# Patient Record
Sex: FEMALE | Race: WHITE | NOT HISPANIC OR LATINO | Employment: FULL TIME | URBAN - METROPOLITAN AREA
[De-identification: names, ages, dates, MRNs, and addresses within clinical notes are randomized per-mention and may not be internally consistent; named-entity substitution may affect disease eponyms.]

---

## 2023-11-29 ENCOUNTER — TELEPHONE (OUTPATIENT)
Dept: OBSTETRICS AND GYNECOLOGY | Facility: CLINIC | Age: 28
End: 2023-11-29
Payer: COMMERCIAL

## 2023-11-29 DIAGNOSIS — N92.6 MISSED MENSES: Primary | ICD-10-CM

## 2023-11-29 NOTE — TELEPHONE ENCOUNTER
Patient has LMP of 10/30/23 and positive pregnancy test at home. Would like to schedule her NOB with Dr. Mcmahon. 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 Orders sent to Dr. Mcmahon for serial HCGs.

## 2023-12-01 ENCOUNTER — LAB (OUTPATIENT)
Dept: LAB | Facility: LAB | Age: 28
End: 2023-12-01
Payer: COMMERCIAL

## 2023-12-01 DIAGNOSIS — N92.6 MISSED MENSES: ICD-10-CM

## 2023-12-01 DIAGNOSIS — Z32.01 PREGNANCY TEST POSITIVE (HHS-HCC): ICD-10-CM

## 2023-12-01 LAB — B-HCG SERPL-ACNC: 2035 MIU/ML

## 2023-12-01 PROCEDURE — 86780 TREPONEMA PALLIDUM: CPT

## 2023-12-01 PROCEDURE — 84702 CHORIONIC GONADOTROPIN TEST: CPT

## 2023-12-01 PROCEDURE — 86317 IMMUNOASSAY INFECTIOUS AGENT: CPT

## 2023-12-01 PROCEDURE — 36415 COLL VENOUS BLD VENIPUNCTURE: CPT

## 2023-12-04 ENCOUNTER — LAB (OUTPATIENT)
Dept: LAB | Facility: LAB | Age: 28
End: 2023-12-04
Payer: COMMERCIAL

## 2023-12-04 DIAGNOSIS — N92.6 MISSED MENSES: ICD-10-CM

## 2023-12-04 DIAGNOSIS — Z32.01 PREGNANCY TEST POSITIVE (HHS-HCC): ICD-10-CM

## 2023-12-04 LAB — B-HCG SERPL-ACNC: 5858 MIU/ML

## 2023-12-04 PROCEDURE — 86803 HEPATITIS C AB TEST: CPT

## 2023-12-04 PROCEDURE — 84702 CHORIONIC GONADOTROPIN TEST: CPT

## 2023-12-04 PROCEDURE — 36415 COLL VENOUS BLD VENIPUNCTURE: CPT

## 2023-12-04 PROCEDURE — 80053 COMPREHEN METABOLIC PANEL: CPT

## 2023-12-04 PROCEDURE — 87340 HEPATITIS B SURFACE AG IA: CPT

## 2023-12-05 ENCOUNTER — INITIAL PRENATAL (OUTPATIENT)
Dept: OBSTETRICS AND GYNECOLOGY | Facility: CLINIC | Age: 28
End: 2023-12-05
Payer: COMMERCIAL

## 2023-12-05 VITALS — DIASTOLIC BLOOD PRESSURE: 81 MMHG | WEIGHT: 175.2 LBS | BODY MASS INDEX: 29.15 KG/M2 | SYSTOLIC BLOOD PRESSURE: 130 MMHG

## 2023-12-05 DIAGNOSIS — Z32.01 PREGNANCY TEST POSITIVE (HHS-HCC): Primary | ICD-10-CM

## 2023-12-05 LAB
ALBUMIN SERPL BCP-MCNC: 4.8 G/DL (ref 3.4–5)
ALP SERPL-CCNC: 42 U/L (ref 33–110)
ALT SERPL W P-5'-P-CCNC: 28 U/L (ref 7–45)
ANION GAP SERPL CALC-SCNC: 16 MMOL/L (ref 10–20)
AST SERPL W P-5'-P-CCNC: 19 U/L (ref 9–39)
BILIRUB SERPL-MCNC: 0.6 MG/DL (ref 0–1.2)
BUN SERPL-MCNC: 12 MG/DL (ref 6–23)
CALCIUM SERPL-MCNC: 10 MG/DL (ref 8.6–10.6)
CHLORIDE SERPL-SCNC: 104 MMOL/L (ref 98–107)
CO2 SERPL-SCNC: 24 MMOL/L (ref 21–32)
CREAT SERPL-MCNC: 0.79 MG/DL (ref 0.5–1.05)
CREAT UR-MCNC: 155.2 MG/DL (ref 20–320)
GFR SERPL CREATININE-BSD FRML MDRD: >90 ML/MIN/1.73M*2
GLUCOSE SERPL-MCNC: 75 MG/DL (ref 74–99)
HBV SURFACE AG SERPL QL IA: NONREACTIVE
HCV AB SER QL: NONREACTIVE
POTASSIUM SERPL-SCNC: 4.5 MMOL/L (ref 3.5–5.3)
PROT SERPL-MCNC: 7.4 G/DL (ref 6.4–8.2)
PROT UR-ACNC: 7 MG/DL (ref 5–24)
PROT/CREAT UR: 0.05 MG/MG CREAT (ref 0–0.17)
SODIUM SERPL-SCNC: 139 MMOL/L (ref 136–145)

## 2023-12-05 PROCEDURE — 0500F INITIAL PRENATAL CARE VISIT: CPT | Performed by: OBSTETRICS & GYNECOLOGY

## 2023-12-05 PROCEDURE — 87661 TRICHOMONAS VAGINALIS AMPLIF: CPT | Mod: 59 | Performed by: OBSTETRICS & GYNECOLOGY

## 2023-12-05 PROCEDURE — 87086 URINE CULTURE/COLONY COUNT: CPT | Performed by: OBSTETRICS & GYNECOLOGY

## 2023-12-05 PROCEDURE — 84156 ASSAY OF PROTEIN URINE: CPT | Performed by: OBSTETRICS & GYNECOLOGY

## 2023-12-05 PROCEDURE — 87800 DETECT AGNT MULT DNA DIREC: CPT | Performed by: OBSTETRICS & GYNECOLOGY

## 2023-12-05 ASSESSMENT — EDINBURGH POSTNATAL DEPRESSION SCALE (EPDS)
I HAVE BEEN ABLE TO LAUGH AND SEE THE FUNNY SIDE OF THINGS: AS MUCH AS I ALWAYS COULD
I HAVE BEEN SO UNHAPPY THAT I HAVE HAD DIFFICULTY SLEEPING: NOT AT ALL
I HAVE BEEN SO UNHAPPY THAT I HAVE BEEN CRYING: NO, NEVER
I HAVE BLAMED MYSELF UNNECESSARILY WHEN THINGS WENT WRONG: NO, NEVER
TOTAL SCORE: 0
I HAVE FELT SCARED OR PANICKY FOR NO GOOD REASON: NO, NOT AT ALL
I HAVE BEEN ANXIOUS OR WORRIED FOR NO GOOD REASON: NO, NOT AT ALL
I HAVE FELT SAD OR MISERABLE: NO, NOT AT ALL
I HAVE LOOKED FORWARD WITH ENJOYMENT TO THINGS: AS MUCH AS I EVER DID
THINGS HAVE BEEN GETTING ON TOP OF ME: NO, I HAVE BEEN COPING AS WELL AS EVER
THE THOUGHT OF HARMING MYSELF HAS OCCURRED TO ME: NEVER

## 2023-12-05 NOTE — PROGRESS NOTES
Subjective   Aron Salinas is a 28 y.o. G1 here to establish OB care. Mirena removed in April 2023. Current OB sx = none. Taking PNV. Pregnancy uncomplicated. Pt was doing OPKs and notes she ovulated a bit later than what her menstrual tracking rebeca told her.    Gynecologic History  Patient's last menstrual period was 10/30/2023.  Cycles regular without dysmenorrhea or menorrhagia  HSV: denies  LEEP: denies. Pap due Oct 2024.    Past Medical History  none    Past Surgical History   none    Social History  Partner: Raman (PGY4 surgery resident)  Occupation: Med-Peds resident (PGY4)  Substances: No T/E/D  Abuse: denies    Allergies  Penicillins and Sulfa (sulfonamide antibiotics)    Medications  PNV    Objective   Vitals:    12/05/23 1512   BP: 130/81     Physical Examination   General:   Alert and oriented, in no acute distress   Neck: Supple. No visible thyromegaly.    Abdomen: Soft, non-tender, gravid   : Normal genitourinary exam without lesions, masses, or abnormal discharge   Psych Normal affect. Normal mood.      TVUS: Fundally located GS seen, no YS or FP yet.     Assessment   Assessment/Plan  Pt was oriented to the practice. Prenatal labs and ultrasound were ordered to be drawn at 12 weeks. Pt opted for genetic screening with cfDNA, deciding btw standard and expanded screening as her brother has T21. Problem list and OB overview updated.    Return OB Visit: 4 weeks with repeat bedside US at that time.    Corrine Mcmahon MD

## 2023-12-05 NOTE — PATIENT INSTRUCTIONS
Welcome to Dr. Mcmahon[facility]!  Below are some commonly asked questions about this practice.    Q: Does Dr. Mcmahon work by herself or as part of a physician group?  Dr. Mcmahon works with a group of other doctors, as well as midwives, in the Department of Ob/Gyn at [facility]. Most of your visits will be with Dr. Mcmahon, but if she is unavailable, an appointment may be scheduled with one of her partners.     Q: How often do I need to see Dr. Mcmahon during my pregnancy?  You need to be seen monthly until the third trimester, then every two weeks from 32-36 weeks, and then weekly until delivery. Some patients may need to be seen more frequently.     Q: What does a prenatal visit involve?  Prenatal appointments are scheduled for 15 minutes, and include getting vital signs, basic labs like urine analysis, and discussion of prenatal symptoms and care recommendations. Ultrasounds are separate and do not replace prenatal visits. If you want to discuss something in depth, you may need to schedule more than one visit to complete the conversation.    Q: Does [facility] utilize a patient portal?  Yes!  [employer] is the strongly preferred way for you to communicate with your healthcare team and to manage your appointments, medications, labs, and more. More info can be found at: [email or web address]     Q: I need to reschedule or cancel an appointment. What should I do?  In order to meet the needs of all our patients, we respectfully ask that you give at least 24 hours notice if you need to cancel or reschedule an appointment.  Please call the office directly to alert them of your change in plans. Patients who repeatedly miss appointments may be dismissed from the practice.    Q: How can I reach Dr. Mcmahon or her staff?  For non-urgent requests, the best way to communicate with us is through  [employer]. For urgent issues, you can call the office. The office number for [place] is [phone] and  the office number for [place] is [phone]. During office hours, the  can transfer you to the nurse line, and after office hours, an answering service can page the doctor on call.    Q: Who will deliver my baby?  Our practice is a group model, meaning Dr. Mcmahon only works on Labor & Delivery a few times a month. Importantly, Dr. Mcmahon only delivers at [facility], not the [facility]. Therefore, it is common for her patients to be delivered by one of her partners at either location. Each of her partners have access to your prenatal information, and hold the same high standards she holds when it comes to caring for you.    Q: What insurances are accepted by Dr. Mcmahon?  Please check with your insurance carrier to confirm that both [facility] and Dr. Mcmahon are within your network. If you have concerns about losing insurance coverage, you may contact the  Insurance Access Line at [phone].      DR. MCMAHON'S PREGNANCY PREP GUIDE    Pregnancy is a life-changing journey, each and every time. Below are some materials and information that may build your confidence, comfort, and knowledge!    SMARTPHONE APPS  Oxxy Pregnancy  Free rebeca that allows you to track your pregnancy's gestational age, fetal growth, and fetal development    Blurb  Information on safety of medications in both pregnancy and breastfeeding, created by the Infant Risk Center at [facility]. You can also contact the Infant Risk organization if the medication you have questions about is not listed in the rebeca.    BOOKS  [facility] Guide to a Healthy Pregnancy: From Doctors who are Parents, Too!  User friendly, practical book by a trusted resource    [facility] Hospital Birth   A non-biased guide to achieving a physiologic birth in a hospital setting, focusing on what you can do, and how to communicate effectively with staff. I recommend pairing this with the website Evidence Based Birth® to stimulate some  thoughtful conversation with your OB provider.    The Fourth Trimester   Rdz postpartum advice from an experienced  and midwife who has worked in high risk settings. Discusses sleep, feeding, intimacy, work, and other important topics from the postpartum period.    The Womanly Art of Breastfeeding  Written by the Yessi Rodriguez, this book has all sorts of practical tips to help you succeed in breastfeeding    Caring for Your Baby and Young Child: Birth to Age 5  Created by the American Academy of Pediatrics, this user-friendly manual provides practical and evidence-based advice for caring for your baby after birth.    PRENATAL NUTRITION  A balanced and healthy diet is good for mom and baby. In general, aim for the following amounts, either through diet or supplementation: folic acid 800mcg daily, omega 3 fatty acids (such as DHA) 250mg daily, Vitamin D3 800IU daily, calcium 1200mg daily, and choline 450mg daily. Fish is a wonderful source of protein and choline (good for baby's brain!) and you can find the FDA's advisory for eating fish during pregnancy here.     PRODUCTS  There is no end to pregnancy-related products. It is, after all, a multi-billion dollar industry. Good sleep, good nutrition, and good stress reduction will help your body cope with pregnancy and recover from delivery better than any one product.     That being said, here are some general items that are helpful during pregnancy:   Maternity clothes: There's no need to test the limits of your pre-pregnancy clothes. Switching into maternity clothes in the second trimester often helps moms feel physically and emotionally more comfortable.  Pregnancy pillow: A good night's sleep is going to be elusive especially in the third trimester. Consider purchasing a supportive body pillow to support your back, stomach, and legs.   Maternity support belt: Pelvic and hip pain can start as early as the second trimester. Ask our office about  maternity belts that may be covered by your insurance. You can also buy belts for around $30 online.  Compression socks: Blood can pool in the legs during pregnancy, causing swelling. Consider wearing compression stockings if you start to experience swelling, and especially if you'll be doing any traveling.  Rich body lotions: Anything that maintains the skin's hydration and elasticity helps diminish the appearance of stretch marks.     Other helpful items to keep around:  a water bottle to keep yourself hydrated, a good heating pad and massage roller for body aches, a yoga ball to sit on in the third trimester, a laxative like Miralax for constipation, TUMs for heartburn, a humidifier and nasal strips for congestion, and high fiber snacks for when you get hungry.     PAPERWORK, PUMPS, AND MORE  Need a proof of pregnancy form? A note for your dentist? A work note? Our office staff should be able to provide you with a copy of any of these forms at any of your visits. Short-term disability and Family Medical Leave Act (FMLA) paperwork should be submitted prior to your due date. Please allow 5-10 business days for processing.     Our office provides a prescription for a breast pump at your request. Many insurance companies cover breast pumps in full. We recommend a double electric breast pump that is portable.    Finally, good communication is parker to good health. Please make sure your phone number, email, and/or address is accurate in our system, and promptly notify the office of any changes.    We strive to provide our patients with the best possible care during their pregnancies, and we are constantly aiming to improve. If you have feedback to give Dr. Mcmahon about her office or practice, feel free to message her via  [employer].

## 2023-12-05 NOTE — LETTER
12/5/2023    To Whom It May Concern:    This is to certify that my patient,Aron Salinas is under my care for her pregnancy.    The following guidelines may be used for any dental work:  You may use a local anesthetic with or without Epinephrine.  You may prescribe any Penicillin or Cephalosporin if an antibiotic is necessary. (Dependent on allergy history)  You may take x-rays with a lead apron if necessary.  You may prescribe Tylenol and/or narcotics for pain.  You may extract teeth if necessary.    If you need further information or if you have any concerns, please contact our office.    Sincerely,        Corrine Mcmahon MD   [facility] [place]

## 2023-12-05 NOTE — LETTER
12/5/2023    To Whom It May Concern:    Aron Salinas is being followed for her pregnancy at [facility].  Estimated Date of Delivery: 8/5/24    Sincerely,        Corrine Mcmahon MD  [facility]

## 2023-12-05 NOTE — Clinical Note
RAY with Lisandro monthly x 7, ok to start at [place] if no availability at [facility], but ultimately plan to get care at [facility] location.

## 2023-12-06 LAB
C TRACH RRNA SPEC QL NAA+PROBE: NEGATIVE
N GONORRHOEA DNA SPEC QL PROBE+SIG AMP: NEGATIVE
RUBV IGG SERPL IA-ACNC: 2.4 IA
RUBV IGG SERPL QL IA: POSITIVE
T PALLIDUM AB SER QL: NONREACTIVE
T VAGINALIS RRNA SPEC QL NAA+PROBE: NEGATIVE

## 2023-12-07 LAB — BACTERIA UR CULT: NORMAL

## 2024-01-09 ENCOUNTER — ROUTINE PRENATAL (OUTPATIENT)
Dept: OBSTETRICS AND GYNECOLOGY | Facility: CLINIC | Age: 29
End: 2024-01-09
Payer: COMMERCIAL

## 2024-01-09 ENCOUNTER — LAB (OUTPATIENT)
Dept: LAB | Facility: LAB | Age: 29
End: 2024-01-09
Payer: COMMERCIAL

## 2024-01-09 VITALS — SYSTOLIC BLOOD PRESSURE: 132 MMHG | BODY MASS INDEX: 28.54 KG/M2 | WEIGHT: 171.5 LBS | DIASTOLIC BLOOD PRESSURE: 82 MMHG

## 2024-01-09 DIAGNOSIS — R03.0 ELEVATED BLOOD PRESSURE READING IN OFFICE WITHOUT DIAGNOSIS OF HYPERTENSION: ICD-10-CM

## 2024-01-09 DIAGNOSIS — Z34.01 FIRST PREGNANCY, FIRST TRIMESTER (HHS-HCC): Primary | ICD-10-CM

## 2024-01-09 DIAGNOSIS — Z82.79 FAMILY HISTORY OF DOWN SYNDROME: ICD-10-CM

## 2024-01-09 DIAGNOSIS — Z32.01 PREGNANCY TEST POSITIVE (HHS-HCC): ICD-10-CM

## 2024-01-09 LAB
ABO GROUP (TYPE) IN BLOOD: NORMAL
ANTIBODY SCREEN: NORMAL
ERYTHROCYTE [DISTWIDTH] IN BLOOD BY AUTOMATED COUNT: 11.9 % (ref 11.5–14.5)
EST. AVERAGE GLUCOSE BLD GHB EST-MCNC: 91 MG/DL
HBA1C MFR BLD: 4.8 %
HCT VFR BLD AUTO: 36.6 % (ref 36–46)
HGB BLD-MCNC: 12.9 G/DL (ref 12–16)
HIV 1+2 AB+HIV1 P24 AG SERPL QL IA: NONREACTIVE
MCH RBC QN AUTO: 30.1 PG (ref 26–34)
MCHC RBC AUTO-ENTMCNC: 35.2 G/DL (ref 32–36)
MCV RBC AUTO: 85 FL (ref 80–100)
NRBC BLD-RTO: 0 /100 WBCS (ref 0–0)
PLATELET # BLD AUTO: 314 X10*3/UL (ref 150–450)
RBC # BLD AUTO: 4.29 X10*6/UL (ref 4–5.2)
RH FACTOR (ANTIGEN D): NORMAL
WBC # BLD AUTO: 8.9 X10*3/UL (ref 4.4–11.3)

## 2024-01-09 PROCEDURE — 81220 CFTR GENE COM VARIANTS: CPT

## 2024-01-09 PROCEDURE — 86900 BLOOD TYPING SEROLOGIC ABO: CPT

## 2024-01-09 PROCEDURE — 83036 HEMOGLOBIN GLYCOSYLATED A1C: CPT

## 2024-01-09 PROCEDURE — 85027 COMPLETE CBC AUTOMATED: CPT

## 2024-01-09 PROCEDURE — 0501F PRENATAL FLOW SHEET: CPT | Performed by: OBSTETRICS & GYNECOLOGY

## 2024-01-09 PROCEDURE — 36415 COLL VENOUS BLD VENIPUNCTURE: CPT

## 2024-01-09 PROCEDURE — 86850 RBC ANTIBODY SCREEN: CPT

## 2024-01-09 PROCEDURE — 87389 HIV-1 AG W/HIV-1&-2 AB AG IA: CPT

## 2024-01-09 PROCEDURE — G0452 MOLECULAR PATHOLOGY INTERPR: HCPCS | Performed by: OBSTETRICS & GYNECOLOGY

## 2024-01-09 PROCEDURE — 81329 SMN1 GENE DOS/DELETION ALYS: CPT

## 2024-01-09 PROCEDURE — 86901 BLOOD TYPING SEROLOGIC RH(D): CPT

## 2024-01-09 RX ORDER — ACETAMINOPHEN 500 MG
1 TABLET ORAL 2 TIMES DAILY
Qty: 1 KIT | Refills: 0 | Status: SHIPPED | OUTPATIENT
Start: 2024-01-09 | End: 2024-01-09 | Stop reason: SDUPTHER

## 2024-01-09 RX ORDER — ACETAMINOPHEN 500 MG
1 TABLET ORAL 2 TIMES DAILY
Qty: 1 KIT | Refills: 0 | Status: SHIPPED | OUTPATIENT
Start: 2024-01-09 | End: 2025-01-08

## 2024-01-09 RX ORDER — NAPROXEN SODIUM 220 MG/1
162 TABLET, FILM COATED ORAL DAILY
Qty: 90 TABLET | Refills: 2 | Status: SHIPPED | OUTPATIENT
Start: 2024-01-09 | End: 2025-01-08

## 2024-01-09 NOTE — PROGRESS NOTES
Feeling well, no complaints.  Here with  Raman.  Initial labs wnl. Will need to complete 1st tri labs.  Continues to have /80s but not requiring meds. Asymptomatic.    TAUS: viable stephen IUP measuring 10+1 weeks with FHR 176bpm. No NEYMAR. No abnormal findings.    - Pt will have PNL and Myriad with expanded screening drawn today.  - NT at 12 weeks.  - Stage 1 HTN vs white coat HT: Home BP monitoring. Recommend bASA at 12 weeks given stage 1 HTN in office not requiring meds.  - FU with me at 16 weeks scheduled.    Corrine Mcmahon MD

## 2024-01-18 LAB
ELECTRONICALLY SIGNED BY: NORMAL
SMA RESULT: NORMAL

## 2024-01-19 LAB
CFTR MUT ANL BLD/T: NORMAL
ELECTRONICALLY SIGNED BY: NORMAL

## 2024-01-23 ENCOUNTER — APPOINTMENT (OUTPATIENT)
Dept: OBSTETRICS AND GYNECOLOGY | Facility: CLINIC | Age: 29
End: 2024-01-23
Payer: COMMERCIAL

## 2024-01-23 ENCOUNTER — HOSPITAL ENCOUNTER (OUTPATIENT)
Dept: RADIOLOGY | Facility: CLINIC | Age: 29
Discharge: HOME | End: 2024-01-23
Payer: COMMERCIAL

## 2024-01-23 DIAGNOSIS — Z32.01 PREGNANCY TEST POSITIVE (HHS-HCC): ICD-10-CM

## 2024-01-23 PROCEDURE — 76815 OB US LIMITED FETUS(S): CPT | Performed by: MEDICAL GENETICS

## 2024-01-23 PROCEDURE — 76801 OB US < 14 WKS SINGLE FETUS: CPT

## 2024-01-24 ENCOUNTER — APPOINTMENT (OUTPATIENT)
Dept: RADIOLOGY | Facility: CLINIC | Age: 29
End: 2024-01-24
Payer: COMMERCIAL

## 2024-01-24 LAB — SCAN RESULT: NORMAL

## 2024-02-21 ENCOUNTER — OFFICE VISIT (OUTPATIENT)
Dept: OBSTETRICS AND GYNECOLOGY | Facility: CLINIC | Age: 29
End: 2024-02-21
Payer: COMMERCIAL

## 2024-02-21 VITALS — WEIGHT: 178 LBS | BODY MASS INDEX: 29.62 KG/M2 | DIASTOLIC BLOOD PRESSURE: 81 MMHG | SYSTOLIC BLOOD PRESSURE: 124 MMHG

## 2024-02-21 DIAGNOSIS — Z82.79 FAMILY HISTORY OF DOWN SYNDROME: Primary | ICD-10-CM

## 2024-02-21 DIAGNOSIS — R03.0 WHITE COAT SYNDROME WITHOUT DIAGNOSIS OF HYPERTENSION: ICD-10-CM

## 2024-02-21 PROBLEM — Z34.01: Status: RESOLVED | Noted: 2024-01-09 | Resolved: 2024-02-21

## 2024-02-21 PROCEDURE — 3079F DIAST BP 80-89 MM HG: CPT | Performed by: OBSTETRICS & GYNECOLOGY

## 2024-02-21 PROCEDURE — 3074F SYST BP LT 130 MM HG: CPT | Performed by: OBSTETRICS & GYNECOLOGY

## 2024-02-21 PROCEDURE — 1036F TOBACCO NON-USER: CPT | Performed by: OBSTETRICS & GYNECOLOGY

## 2024-02-21 PROCEDURE — 99214 OFFICE O/P EST MOD 30 MIN: CPT | Mod: TH | Performed by: OBSTETRICS & GYNECOLOGY

## 2024-02-21 PROCEDURE — 99214 OFFICE O/P EST MOD 30 MIN: CPT | Performed by: OBSTETRICS & GYNECOLOGY

## 2024-02-21 NOTE — PROGRESS NOTES
PNL reviewed and wnl  NT could not be measured but pt had RR cfDNA - BOY  White Coat HTN: Home -120s/70s. Start bASA.  Fibroids seen on 1st trimester scan, largest 4cm.     Corrine Mcmahon MD

## 2024-03-15 ENCOUNTER — TELEPHONE (OUTPATIENT)
Dept: OBSTETRICS AND GYNECOLOGY | Facility: CLINIC | Age: 29
End: 2024-03-15
Payer: COMMERCIAL

## 2024-03-15 NOTE — TELEPHONE ENCOUNTER
"Patient called with complaint of 45 pound dog jumping on her abdomen 2 days ago.  Patient has some slight \"cramping\" below her umbilicus especially on movement.    Patient denies acute pain, vaginal bleeding or leaking, vomiting,  She is urinating without pain or difficulty.    She is 19 weeks and 4 days.  Patient will monitor and go to L+D for any increasing pain or bleeding.  Plan reviewed with Dr. Humphreys.  Will keep 3/20 appointment for anatomy scan and prenatal appointment.    "

## 2024-03-20 ENCOUNTER — OFFICE VISIT (OUTPATIENT)
Dept: OBSTETRICS AND GYNECOLOGY | Facility: CLINIC | Age: 29
End: 2024-03-20
Payer: COMMERCIAL

## 2024-03-20 ENCOUNTER — HOSPITAL ENCOUNTER (OUTPATIENT)
Dept: RADIOLOGY | Facility: CLINIC | Age: 29
Discharge: HOME | End: 2024-03-20
Payer: COMMERCIAL

## 2024-03-20 VITALS — BODY MASS INDEX: 30.32 KG/M2 | DIASTOLIC BLOOD PRESSURE: 73 MMHG | SYSTOLIC BLOOD PRESSURE: 125 MMHG | WEIGHT: 182.2 LBS

## 2024-03-20 DIAGNOSIS — Z34.02 ENCOUNTER FOR SUPERVISION OF NORMAL FIRST PREGNANCY, SECOND TRIMESTER (HHS-HCC): Primary | ICD-10-CM

## 2024-03-20 DIAGNOSIS — Z36.2 ENCOUNTER FOR OTHER ANTENATAL SCREENING FOLLOW-UP (HHS-HCC): ICD-10-CM

## 2024-03-20 DIAGNOSIS — R03.0 WHITE COAT SYNDROME WITHOUT DIAGNOSIS OF HYPERTENSION: ICD-10-CM

## 2024-03-20 PROCEDURE — 99213 OFFICE O/P EST LOW 20 MIN: CPT | Performed by: OBSTETRICS & GYNECOLOGY

## 2024-03-20 PROCEDURE — 99213 OFFICE O/P EST LOW 20 MIN: CPT | Mod: TH | Performed by: OBSTETRICS & GYNECOLOGY

## 2024-03-20 PROCEDURE — 76811 OB US DETAILED SNGL FETUS: CPT

## 2024-03-20 PROCEDURE — 1036F TOBACCO NON-USER: CPT | Performed by: OBSTETRICS & GYNECOLOGY

## 2024-03-20 PROCEDURE — 3074F SYST BP LT 130 MM HG: CPT | Performed by: OBSTETRICS & GYNECOLOGY

## 2024-03-20 PROCEDURE — 76811 OB US DETAILED SNGL FETUS: CPT | Performed by: OBSTETRICS & GYNECOLOGY

## 2024-03-20 PROCEDURE — 3078F DIAST BP <80 MM HG: CPT | Performed by: OBSTETRICS & GYNECOLOGY

## 2024-03-20 NOTE — PROGRESS NOTES
Anatomy scan today  White coat HTN: BP nl now.    Has intermittent low midline cramping a few times a day, mostly when standing.  Does not sound like PTL.  Not feeling much mvmt yet. Has anterior placenta.    Corrine Mcmahon MD

## 2024-04-17 ENCOUNTER — APPOINTMENT (OUTPATIENT)
Dept: OBSTETRICS AND GYNECOLOGY | Facility: CLINIC | Age: 29
End: 2024-04-17
Payer: COMMERCIAL

## 2024-04-17 ENCOUNTER — ROUTINE PRENATAL (OUTPATIENT)
Dept: OBSTETRICS AND GYNECOLOGY | Facility: CLINIC | Age: 29
End: 2024-04-17
Payer: COMMERCIAL

## 2024-04-17 VITALS — BODY MASS INDEX: 31.02 KG/M2 | WEIGHT: 186.4 LBS | DIASTOLIC BLOOD PRESSURE: 80 MMHG | SYSTOLIC BLOOD PRESSURE: 128 MMHG

## 2024-04-17 DIAGNOSIS — Z34.02 ENCOUNTER FOR SUPERVISION OF NORMAL FIRST PREGNANCY, SECOND TRIMESTER (HHS-HCC): ICD-10-CM

## 2024-04-17 DIAGNOSIS — R03.0 WHITE COAT SYNDROME WITHOUT DIAGNOSIS OF HYPERTENSION: Primary | ICD-10-CM

## 2024-04-17 PROCEDURE — 0501F PRENATAL FLOW SHEET: CPT | Performed by: OBSTETRICS & GYNECOLOGY

## 2024-04-17 ASSESSMENT — EDINBURGH POSTNATAL DEPRESSION SCALE (EPDS)
THE THOUGHT OF HARMING MYSELF HAS OCCURRED TO ME: NEVER
I HAVE BEEN SO UNHAPPY THAT I HAVE HAD DIFFICULTY SLEEPING: NOT AT ALL
I HAVE BEEN SO UNHAPPY THAT I HAVE BEEN CRYING: NO, NEVER
TOTAL SCORE: 0
I HAVE FELT SCARED OR PANICKY FOR NO GOOD REASON: NO, NOT AT ALL
THINGS HAVE BEEN GETTING ON TOP OF ME: NO, I HAVE BEEN COPING AS WELL AS EVER
I HAVE BLAMED MYSELF UNNECESSARILY WHEN THINGS WENT WRONG: NO, NEVER
I HAVE FELT SAD OR MISERABLE: NO, NOT AT ALL
I HAVE BEEN ANXIOUS OR WORRIED FOR NO GOOD REASON: NO, NOT AT ALL
I HAVE BEEN ABLE TO LAUGH AND SEE THE FUNNY SIDE OF THINGS: AS MUCH AS I ALWAYS COULD
I HAVE LOOKED FORWARD WITH ENJOYMENT TO THINGS: AS MUCH AS I EVER DID

## 2024-04-17 NOTE — PATIENT INSTRUCTIONS
DR. VANG'S PREGNANCY SUPPORT    [facility] CHILDBIRTH & PARENTING EDUCATION (Virtual & By Appointment Services)  http://www.[facility].org/gavino/health-and-wellness/pregnancy-resources/childbirth-and-parenting-education-programs  [phone]  Recommended programs include the Prepared Childbirth series, the Spinning Babies course, the Infant Care series, Boot Camp for New Dads, Car Seat Safety assessment, Friends & Family CPR, and Prenatal Tours. If planning to labor without an epidural, recommend the Hypnobirthing and Spinning Babies course.    [facility] INTEGRATIVE HEALTH NETWORK  https://www.[facility].org/services/integrative-health-network/meet-the-team  [phone]  Network of acupuncturists, massage therapists, chiropractors, and integrative medicine specialists who assist with a variety of pregnancy-related concerns including but not limited to muscle or joint pain, breech presentation, and chronic pain conditions.    [facility] WOMEN'S MENTAL HEALTH CLINIC  https://www.[facility].org/services/psychiatry/conditions-treatments/womens-mental-health   [phone]  Specially trained team of mental health professionals who are experts in the treatment of anxiety, depression, bipolar illness, emotional trauma, and other mental conditions affecting pregnancy.    LOOKING FOR PROFESSIONAL BIRTH SUPPORT?  [employer] ( Certifying Organization)  https://www.ruddy.org/  Type in your zip code to find a certified birth and postpartum  near you    [facility]  http://www.[facility].org/    CALM  SERVICES  https://calmlabKCAP Servicesce.Am[employer] Biosciences/    STEPHEN MA'AM  https://www.Sh[employer] Dajun Technologiesam.[employer]/    CLEBABY  https://www.clebaby.com    THE WOMB WELLNESS CENTER  http://www.Vastrm.[employer]/    NURTURED FOUNDATION   https://nurturedfArcos Technologies.com/    THE VILLAGE CECY  https://www.Optima Diagnosticsllagecle.org     FRUIT OF THE WOMB   SERVICES  https://www.fruitofthewombirth.com     HELPFUL ONLINE RESOURCES   Evidence Based Birth ® website    Spinning Babies® website: “Flip a Breech,” “Engaging Baby in Labor,” “Three Levels of the Pelvis”    Barbara Mata (CEDRIC-certified  and birth educator) videos on Youtube     Pregnancy and Postpartum TV videos (YouTube) - especially check out videos on diastasis recti, pelvic floor exercises, and pregnancy yoga for sciatica and low back pain     “How to Turn a Breech, Posterior or Transverse baby” - Fit Mums Channel (YouTube)    “How to do External Cephalic Version - Professional Version” - Merck Manuals (YouTube)    “False Labor vs.  True Labor” - [facility] (YouTube)    “The 8 Best Labor Positions for the First Phases of Childbirth” - The Bump (Youtube)    “ Section” - [employer] Baby[employer] (YouTube)     L[employer] Concept breastfeeding videos (YouTube) - especially check out “Hand Expression” and “Breast Massage for Engorgement”     “How to Put on a Haaka Silicone Breastpump” - New MiniMon[employer] Life by Eileen (Youtube)    DR. VANG'S GUIDE TO WRITING A BIRTH PLAN    A birth plan is a written statement of how a parent would like her labor and delivery to occur. Not every parent cares to make one, but a lot of parents do find the process of writing a birth plan useful.    I used to give patients a check-box style template to fill out, but I've found over time that such a document does not properly reflect the underlying goals of the birth plan, and can sometimes create unnecessary conflict between the parents and staff. If you are interested in a more templated type birth plan, I suggest exploring the one on the [place] [facility] website.     These days, I suggest you write a personalized birth plan. It should be relatively short (no more than 1-2 pages), polite, and help the care team understand you and your goals in a holistic way, highlighting any requests  that are very important to you. Use positive rather than negative statements (e.g., “I am eager to see what my body can accomplish naturally” rather than “I do not want Pitocin.”).    Some things to think about as you write your birth plan:  What would your ideal birth look like? How might you bring elements of that into your hospital birth? You don't have to necessarily put environmental “wants” into the birth plan document, but these are good things to discuss with your provider ahead of time, so you know what's allowed and what might be modified to meet your wishes.  What role will you play in achieving the birth you want? How did you prepare? How can the team support your work?   Some births will need medical intervention for the safety of the mom or baby. When such an intervention needs to occur, how would you like that interaction to look? What would make you feel safe and empowered when hard choices must be made?  Do you have past experiences, good or bad, that influence your vision for this birth? If so, consider sharing some aspect of that with your care providers. Be specific about the details that will make a huge difference to you, especially if you might experience resentment or regret if those details are missed.  Who will speak for you if you're too tired, too sick, or too engrossed in labor? Let staff know ahead of time, so they don't assume someone is substituting their preferences for yours.    Some things that parents commonly put in their birth plans are standard at [facility]. We allow a long labor before declaring it failed, we provide intermittent auscultation for medically qualified pregnancies, our episiotomy rate is <1%, we delay cord clamping for at least 30 seconds, and encourage skin-to-skin for healthy newborns. Your prenatal visits are a good time to learn more about the practice patterns at this particular institution.    I encourage you to complete your birth plan at least  2-3 weeks before your expected delivery date, so we can discuss it before you arrive in the hospital.     We look forward to working with you in achieving a safe and memorable birth.    [facility] GUIDELINES FOR FETAL MONITORING    During your birth, it is important to ensure you and your baby are safe.   For this reason, some degree of fetal monitoring is always performed.     Some women may qualify for intermittent monitoring instead of continuous monitoring. This allows the woman to move around more during her labor.     To qualify for this, the woman must have a normal fetal monitoring result when she is first admitted to the hospital, AND be absent any high risk criteria (see below):   The use of labor-inducing medications (oxytocin, misoprostol, or Cervidil®)   Epidural analgesia   Fentanyl patient controlled analgesia   Meconium stained amniotic fluid   Previous  delivery   Diabetes, other than gestational diet-controlled   Hypertensive disorder   Intrauterine growth restriction   Multiple gestation   Low fluid   Gestation < 37.0 weeks   History of previous stillbirth   Unexplained vaginal bleeding   Maternal temp > 37.9 °C   Other known indication for continuous electronic fetal monitoring     If you are interested in intermittent monitoring, please let your provider know so we can discuss it in more detail.    [facility] GUIDELINES FOR NITROUS OXIDE  Nitrous oxide is a patient-administered pre-epidural pain relief option at [facility].  If you have any of the following conditions, unfortunately you will NOT be able to utilize nitrous oxide:  Inability to hold mask to face independent  Received IV narcotics within the last 2 hours  Impaired or intoxicated  Impaired oxygenation: cystic fibrosis or chronic lung disease, baseline O2 saturation <96%, history of sleep apnea recommended for CPAP  Active COVID+   B12 deficiency  Conditions that include air in a closed body  space: bowel obstruction, acute ear infection, acute pneumothorax, recent craniotomy, increased intracranial pressure, penetrating eye injury, retinal surgery  Hemodynamically unstable  Magnesium Sulfate administration  <35 weeks gestation  Epidural administration

## 2024-05-13 ENCOUNTER — LAB (OUTPATIENT)
Dept: LAB | Facility: LAB | Age: 29
End: 2024-05-13
Payer: COMMERCIAL

## 2024-05-13 ENCOUNTER — ROUTINE PRENATAL (OUTPATIENT)
Dept: OBSTETRICS AND GYNECOLOGY | Facility: CLINIC | Age: 29
End: 2024-05-13
Payer: COMMERCIAL

## 2024-05-13 VITALS — WEIGHT: 191.8 LBS | DIASTOLIC BLOOD PRESSURE: 77 MMHG | SYSTOLIC BLOOD PRESSURE: 127 MMHG | BODY MASS INDEX: 31.92 KG/M2

## 2024-05-13 DIAGNOSIS — Z34.03 ENCOUNTER FOR PRENATAL CARE OF FIRST PREGNANCY, THIRD TRIMESTER (HHS-HCC): Primary | ICD-10-CM

## 2024-05-13 DIAGNOSIS — Z82.79 FAMILY HISTORY OF DOWN SYNDROME: ICD-10-CM

## 2024-05-13 DIAGNOSIS — R03.0 WHITE COAT SYNDROME WITHOUT DIAGNOSIS OF HYPERTENSION: ICD-10-CM

## 2024-05-13 DIAGNOSIS — N92.6 MISSED MENSES: ICD-10-CM

## 2024-05-13 DIAGNOSIS — Z34.02 ENCOUNTER FOR SUPERVISION OF NORMAL FIRST PREGNANCY, SECOND TRIMESTER (HHS-HCC): ICD-10-CM

## 2024-05-13 DIAGNOSIS — Z23 NEED FOR TDAP VACCINATION: ICD-10-CM

## 2024-05-13 LAB
25(OH)D3 SERPL-MCNC: 32 NG/ML (ref 30–100)
ERYTHROCYTE [DISTWIDTH] IN BLOOD BY AUTOMATED COUNT: 12.5 % (ref 11.5–14.5)
GLUCOSE 1H P 50 G GLC PO SERPL-MCNC: 149 MG/DL
HCT VFR BLD AUTO: 33.9 % (ref 36–46)
HGB BLD-MCNC: 11.4 G/DL (ref 12–16)
MCH RBC QN AUTO: 30.6 PG (ref 26–34)
MCHC RBC AUTO-ENTMCNC: 33.6 G/DL (ref 32–36)
MCV RBC AUTO: 91 FL (ref 80–100)
NRBC BLD-RTO: 0 /100 WBCS (ref 0–0)
PLATELET # BLD AUTO: 267 X10*3/UL (ref 150–450)
RBC # BLD AUTO: 3.73 X10*6/UL (ref 4–5.2)
REFLEX ADDED, ANEMIA PANEL: NORMAL
TREPONEMA PALLIDUM IGG+IGM AB [PRESENCE] IN SERUM OR PLASMA BY IMMUNOASSAY: NONREACTIVE
WBC # BLD AUTO: 11.2 X10*3/UL (ref 4.4–11.3)

## 2024-05-13 PROCEDURE — 0501F PRENATAL FLOW SHEET: CPT | Performed by: OBSTETRICS & GYNECOLOGY

## 2024-05-13 PROCEDURE — 36415 COLL VENOUS BLD VENIPUNCTURE: CPT

## 2024-05-13 PROCEDURE — 90715 TDAP VACCINE 7 YRS/> IM: CPT | Performed by: OBSTETRICS & GYNECOLOGY

## 2024-05-13 PROCEDURE — 85027 COMPLETE CBC AUTOMATED: CPT

## 2024-05-13 PROCEDURE — 86780 TREPONEMA PALLIDUM: CPT

## 2024-05-13 PROCEDURE — 82947 ASSAY GLUCOSE BLOOD QUANT: CPT

## 2024-05-13 PROCEDURE — 82306 VITAMIN D 25 HYDROXY: CPT

## 2024-05-13 NOTE — PROGRESS NOTES
SUBJECTIVE  HPI: Aron Salinas is a 28 y.o.  at 28w0d here for RPNV. Denies contractions, bleeding, or LOF. Reports normal fetal movement. Patient reports she will get 2nd tri labs done today. BP remain normal. Tdap today.    OBJECTIVE  Visit Vitals  /77   Wt 87 kg (191 lb 12.8 oz)   LMP 10/30/2023   BMI 31.92 kg/m²   OB Status Pregnant   Smoking Status Never   BSA 2 m²        ASSESSMENT & PLAN  Aron Salinas is a 28 y.o.  at 28w0d. Problem list updated and edits to plan as below:    Family history of Down syndrome  Sp RR expanded cfDNA - BOY    White coat syndrome without diagnosis of hypertension  bASA 162mg daily at 12 weeks  Home BP monitoring recommended     Orders Placed This Encounter   Procedures    Tdap vaccine, age 7 years and older  (BOOSTRIX)     RTC in 2 weeks    Corrine Mcmahon MD

## 2024-05-14 DIAGNOSIS — R73.09 ELEVATED GLUCOSE TOLERANCE TEST: ICD-10-CM

## 2024-05-18 ENCOUNTER — LAB (OUTPATIENT)
Dept: LAB | Facility: LAB | Age: 29
End: 2024-05-18
Payer: COMMERCIAL

## 2024-05-18 DIAGNOSIS — R73.09 ELEVATED GLUCOSE TOLERANCE TEST: ICD-10-CM

## 2024-05-18 LAB
GLUCOSE 1H P 100 G GLC PO SERPL-MCNC: 160 MG/DL
GLUCOSE 2H P 100 G GLC PO SERPL-MCNC: 135 MG/DL
GLUCOSE 3H P 100 G GLC PO SERPL-MCNC: 69 MG/DL
GLUCOSE P FAST SERPL-MCNC: 80 MG/DL

## 2024-05-18 PROCEDURE — 82952 GTT-ADDED SAMPLES: CPT

## 2024-05-18 PROCEDURE — 82951 GLUCOSE TOLERANCE TEST (GTT): CPT

## 2024-05-18 PROCEDURE — 82950 GLUCOSE TEST: CPT

## 2024-05-18 PROCEDURE — 36415 COLL VENOUS BLD VENIPUNCTURE: CPT

## 2024-05-18 PROCEDURE — 82947 ASSAY GLUCOSE BLOOD QUANT: CPT

## 2024-05-29 ENCOUNTER — ROUTINE PRENATAL (OUTPATIENT)
Dept: OBSTETRICS AND GYNECOLOGY | Facility: CLINIC | Age: 29
End: 2024-05-29
Payer: COMMERCIAL

## 2024-05-29 VITALS — SYSTOLIC BLOOD PRESSURE: 118 MMHG | BODY MASS INDEX: 32.72 KG/M2 | WEIGHT: 196.6 LBS | DIASTOLIC BLOOD PRESSURE: 77 MMHG

## 2024-05-29 DIAGNOSIS — Z34.03 ENCOUNTER FOR SUPERVISION OF NORMAL FIRST PREGNANCY, THIRD TRIMESTER (HHS-HCC): Primary | ICD-10-CM

## 2024-05-29 PROCEDURE — 0501F PRENATAL FLOW SHEET: CPT | Performed by: OBSTETRICS & GYNECOLOGY

## 2024-06-10 ENCOUNTER — ROUTINE PRENATAL (OUTPATIENT)
Dept: OBSTETRICS AND GYNECOLOGY | Facility: CLINIC | Age: 29
End: 2024-06-10
Payer: COMMERCIAL

## 2024-06-10 VITALS — WEIGHT: 197 LBS | DIASTOLIC BLOOD PRESSURE: 74 MMHG | BODY MASS INDEX: 32.78 KG/M2 | SYSTOLIC BLOOD PRESSURE: 121 MMHG

## 2024-06-10 DIAGNOSIS — Z34.80 SUPERVISION OF OTHER NORMAL PREGNANCY, ANTEPARTUM (HHS-HCC): Primary | ICD-10-CM

## 2024-06-10 PROCEDURE — 0501F PRENATAL FLOW SHEET: CPT | Performed by: ADVANCED PRACTICE MIDWIFE

## 2024-06-10 NOTE — PROGRESS NOTES
"Subjective   Patient ID [id number]   Aron Salinas is a 28 y.o.  at 32w0d with a working estimated date of delivery of 2024, by Last Menstrual Period who presents for a routine prenatal visit. She denies vaginal bleeding, leakage of fluid, decreased fetal movements, or contractions.    Endorses more noticeable swelling in lower extremities, not often wearing compression stockings.     Her pregnancy is complicated by:  -white coat HTN    Objective   Physical Exam:   Weight: 89.4 kg (197 lb)  Expected Total Weight Gain: 7 kg (15 lb)-11.5 kg (25 lb)   Pregravid BMI: 29.12  BP: 121/74                  Prenatal Labs  Urine Dip:  No results found for: \"KETONESU\", \"GLUCOSEUR\", \"LEUKOCYTESUR\"  Lab Results   Component Value Date    HGB 11.4 (L) 2024    HCT 33.9 (L) 2024    ABO A 2024    HEPBSAG Nonreactive 2023        Assessment/Plan     Continue prenatal vitamin.  Discussed SpinningBabies to encourage vertex presentation.   Labs reviewed.  Expected mode of delivery vaginal.  Follow up in 1 week for a routine prenatal visit.  "

## 2024-06-10 NOTE — LETTER
6/10/2024    To Whom It May Concern:    Aron Salinas is being followed for her pregnancy at [facility].  Estimated Date of Delivery: 8/5/24. She will be 34.0 weeks on the date of her return trip to [place], 6/24/2024. There is no contraindication to travel at this time.     Sincerely,        NAA LAURA Sepulveda  [facility]

## 2024-06-12 ENCOUNTER — APPOINTMENT (OUTPATIENT)
Dept: OBSTETRICS AND GYNECOLOGY | Facility: CLINIC | Age: 29
End: 2024-06-12
Payer: COMMERCIAL

## 2024-06-24 ENCOUNTER — APPOINTMENT (OUTPATIENT)
Dept: OBSTETRICS AND GYNECOLOGY | Facility: CLINIC | Age: 29
End: 2024-06-24
Payer: COMMERCIAL

## 2024-06-26 ENCOUNTER — APPOINTMENT (OUTPATIENT)
Dept: OBSTETRICS AND GYNECOLOGY | Facility: CLINIC | Age: 29
End: 2024-06-26
Payer: COMMERCIAL

## 2024-06-26 VITALS — DIASTOLIC BLOOD PRESSURE: 79 MMHG | WEIGHT: 204 LBS | SYSTOLIC BLOOD PRESSURE: 122 MMHG | BODY MASS INDEX: 33.95 KG/M2

## 2024-06-26 DIAGNOSIS — R03.0 WHITE COAT SYNDROME WITHOUT DIAGNOSIS OF HYPERTENSION: Primary | ICD-10-CM

## 2024-06-26 PROCEDURE — 0501F PRENATAL FLOW SHEET: CPT | Performed by: OBSTETRICS & GYNECOLOGY

## 2024-06-26 NOTE — PROGRESS NOTES
1 hr glucose 149 --> 3 hr glucose 4/4 nl  White coat HTN  Pt wants to know if fetus is breech.     Pt would like 39+6 week IOL.      Corrine Mcmahon MD

## 2024-07-01 ENCOUNTER — ROUTINE PRENATAL (OUTPATIENT)
Dept: OBSTETRICS AND GYNECOLOGY | Facility: CLINIC | Age: 29
End: 2024-07-01
Payer: COMMERCIAL

## 2024-07-01 VITALS — BODY MASS INDEX: 33.7 KG/M2 | SYSTOLIC BLOOD PRESSURE: 122 MMHG | DIASTOLIC BLOOD PRESSURE: 78 MMHG | WEIGHT: 202.5 LBS

## 2024-07-01 DIAGNOSIS — Z34.03 ENCOUNTER FOR SUPERVISION OF NORMAL FIRST PREGNANCY, THIRD TRIMESTER (HHS-HCC): Primary | ICD-10-CM

## 2024-07-01 PROCEDURE — 0501F PRENATAL FLOW SHEET: CPT | Performed by: OBSTETRICS & GYNECOLOGY

## 2024-07-01 NOTE — PROGRESS NOTES
White coat HTN: BP stable  GBS next visit    She has packed her hospital bags already.    Corrine Mcmahon MD

## 2024-07-08 ENCOUNTER — ROUTINE PRENATAL (OUTPATIENT)
Dept: OBSTETRICS AND GYNECOLOGY | Facility: CLINIC | Age: 29
End: 2024-07-08
Payer: COMMERCIAL

## 2024-07-08 VITALS — WEIGHT: 203.4 LBS | DIASTOLIC BLOOD PRESSURE: 77 MMHG | SYSTOLIC BLOOD PRESSURE: 131 MMHG | BODY MASS INDEX: 33.85 KG/M2

## 2024-07-08 DIAGNOSIS — R03.0 WHITE COAT SYNDROME WITHOUT DIAGNOSIS OF HYPERTENSION: Primary | ICD-10-CM

## 2024-07-08 DIAGNOSIS — R03.0 WHITE COAT SYNDROME WITHOUT DIAGNOSIS OF HYPERTENSION: ICD-10-CM

## 2024-07-08 DIAGNOSIS — Z3A.36 36 WEEKS GESTATION OF PREGNANCY (HHS-HCC): Primary | ICD-10-CM

## 2024-07-08 PROCEDURE — 0501F PRENATAL FLOW SHEET: CPT | Performed by: OBSTETRICS & GYNECOLOGY

## 2024-07-08 PROCEDURE — 87081 CULTURE SCREEN ONLY: CPT | Performed by: OBSTETRICS & GYNECOLOGY

## 2024-07-08 NOTE — PATIENT INSTRUCTIONS
DR. MCMAHON'S DELIVERY GUIDE    HOW SHOULD I PREPARE?  Think about what you want your delivery to be like and let your team know  Don't hesitate to speak up if you have concerns or questions  Stay mentally flexible - even with modern medicine, delivery can be unpredictable!  Make sure you have reliable and timely transportation to the hospital  Keep your gas tank at least ¼ full  Keep a towel in the car (to catch amniotic fluid if your water breaks)  Plan for childcare, weather-related traffic delays, etc.  Hospital bag suggestions: birth plan, phone chargers, personal toiletries, hair accessories, chewing gum, water bottle, personal pillow or blanket, things to help you relax during labor, change of clothes for postpartum, nursing bra, eye mask and ear plugs, flip flops for shower, personal towel for shower, baby clothes for going home (bring 1 size up and down from expected size)    WHEN SHOULD I CALL?  When your water breaks (can feel like a gush, or a non-stop trickle of fluid)  When your contractions have been happening regularly for at least 2 hours non-stop AND  First Delivery: Contractions are occurring every 5 minutes or less  Repeat Delivery/Planned : Contractions are occurring every 8 minutes or less  If the baby is moving less than usual   If you experience bleeding like a period  For any other symptoms that just doesn't feel “right” to you    WHO DO I CALL?  Call [phone] for the [facility] [facility] office and [phone] for the [place] office. During the day, ask the  to direct you to the office nurse. After hours, you will be directed to the doctor on Labor & Delivery by the answering service. Please make sure to tell the answering service which L&D location you plan to go to so they can contact the correct on call provider.    WHERE DO I DELIVER?  Low risk pregnancies have the option to deliver at any  hospital. High risk pregnancies should deliver at [facility]. Dr. Mcmahon  only delivers at [facility], typically on Thursday nights. To schedule a hospital tour, call [phone].    HOW DO I GET THERE?  [facility] at [facility]: [address], [place]. This will take you to the entrance of [facility], which connects to [facility]. The parking garage is also closest to this address, and there is a  as well. Walk through [facility] towards the atrium, then [facility] will be on your right just past the [employer] shop. Labor and Delivery is located on the 2nd floor via elevator.   [facility] Women's and  [place] at [facility]: [address] [facility]. Entrance is located between the Emergency Department and the Main Hospital building. Look for the [employer] logo above the entrance. You can also enter from the entrance to the [facility] where there is a  - just turn right past the information desk and walk down the gallery fuller until you reach [facility]. Labor and Delivery is located on the 3rd floor via elevator.    WHO CAN BE WITH ME?  The visitor policy can be found online at [email or web address]://[email or web address]/healthcare-update/general-visitor-information.   Certified doulas do not count as visitors.     HOW CAN I DECREASE MY RISK FOR  DELIVERY?  Keep your body as strong and healthy as possible  Look through the Spinning Babies® website to understand how movement can help your baby get in the right place in the pelvis   Use professional birth support, such as a    Change positions frequently during labor  Stay well hydrated throughout your labor, including “clears” for caloric nourishment (honey water, apple juice, etc.)   Allow enough time for labor - it can take over 24 hours!  Rest when you can so you are mentally ready to push once you get to 10cm dilated  Try pushing in a different position if you're not making progress. Consider pushing on hands and knees, closed  knee pushing, use of a birthing bar, etc.     Delivery is a team effort. Please speak up if you don't feel included in the decision-making.       DR. VANG'S POSTPARTUM GUIDE    Going through all the physical and emotional changes of pregnancy is no small feat, and it's important to realize these changes continue even after delivery. Only about 50% of women go to their postpartum visits, which means a lot of women are missing out on an opportunity to check on their own health, receive support, and ask important questions. Every woman should have a check-up 4-6 weeks after their delivery, and some women will need to be seen even sooner than that.    We highly encourage you to take your postpartum care just as seriously as your prenatal care. Many serious long-term health issues related to pregnancy actually happen postpartum. Our goal is to help you feel supported, capable, and safe as you navigate your unique post-pregnancy journey.    SAFETY  You should immediately contact the doctor's office if you experience any of the following:  Chest pain or trouble breathing  Blood pressures >150/100, that is just as high or higher when repeated 20 minutes later  Severe abdominal pain not responding to your discharge pain medications  Heavy bleeding fully soaking a pad in under 30 minutes or soiling your clothes   Chills, shakes, or fever >100.4°F  Suicidal thoughts  You should also call anytime you just don't feel right - it is always better to be safe than sorry!    COMMON POSTPARTUM CONCERNS  A variety of other postpartum issues are just as harmful to a woman's wellbeing and health, even if they are not deadly. Please call for an appointment if you have concerns about the following, or any other bothersome issue:  Breastfeeding difficulties  Mood changes such as depression or anxiety  Pain during sex or with activity  Abnormal uterine bleeding past 6-8 weeks postpartum  You are not alone, and we are here to help  you!    WHAT TO EXPECT AT YOUR POSTPARTUM VISIT  During your postpartum visit, we will ask you questions about your pregnancy complications, postpartum mood and support, infant feeding journey, contraceptive plans, and more. If your concerns require a more in-depth evaluation, we may ask you to come back for a follow-up visit. Certain follow-up visits may be billed outside of your insurance's OB global package.     POSTPARTUM SUPPORT RESOURCES    [facility] POSTPARTUM PROGRAMMING  https://www.[facility] [place].org/services/obgy-[facility]/patient-resources/classes-and-support   [phone]  Free parenting support offered via “Mommy and Baby Too!” peer groups and WIC-lead “Healthy Mom and Baby” programs.    [facility] LACTATION SUPPORT  http://www.[facility] [place].org/gavino/health-and-wellness/pregnancy-resources/lactation-services  [phone]  In person and virtual appointments offered at multiple locations for breastfeeding support.    BREASTFEEDING MEDICINE [facility]  [email or web address]  [phone]. Provides full spectrum breastfeeding assistance. Located in the [facility] Pediatric[facility] building, but open to parents seeing other pediatricians.    [facility] PELVIC FLOOR PHYSICAL THERAPY  https://www.[facility] [place].org/services/obgy-[facility]/female-pelvic-health/conditions-and-treatments/eurqne-sjbtq-ebezejgiidgzlm  [phone]  Licensed female therapists help with a variety of postpartum pelvic floor concerns, including pain, weakness, incontinence, and more. Referral may be required.     [facility] WOMEN'S MENTAL HEALTH CLINIC  https://www.[facility] [place].org/services/psychiatry/conditions-treatments/womens-mental-health   [phone], ask for “Women's Mental Health” providers for help with postpartum anxiety, depression, OCD, PTSD, and more.    HELP ME GROW  http://www.helpmegrow.[place].gov/  Help Me Grow is an evidence-based program that promotes healthy  growth and development for babies and young children by providing professional support in the home for pregnant mothers or new parents. You can self-refer through the website or ask your doctor to make a referral.    LOOKING FOR PROFESSIONAL POSTPARTUM SUPPORT?  CEDRIC [employer] ( Certifying Organization)  https://www.cedric.org/  Type in your zip code to find a certified postpartum  near you    [employer]  http://www.birthMartha's Vineyard HospitalKunlun.org/    THE [facility]  http://[email or web address] [email or web address].AVA.ai/    NURInvisible ConnectD [place]   https://[email or web address].[email or web address]/

## 2024-07-08 NOTE — PROGRESS NOTES
White coat HTN: BP stable  GBS today    RN messaged to schedule 39 week IOL. Pt offered to come in Thursday PM and preferred Friday AM at 39+5 weeks. May be interested in serial membrane sweeps.    EFW by [place] expected to be around 8.5#.    Corrine Mcmahon MD

## 2024-07-11 LAB — GP B STREP GENITAL QL CULT: NORMAL

## 2024-07-15 ENCOUNTER — ROUTINE PRENATAL (OUTPATIENT)
Dept: OBSTETRICS AND GYNECOLOGY | Facility: CLINIC | Age: 29
End: 2024-07-15
Payer: COMMERCIAL

## 2024-07-15 VITALS — SYSTOLIC BLOOD PRESSURE: 133 MMHG | DIASTOLIC BLOOD PRESSURE: 78 MMHG | BODY MASS INDEX: 34.78 KG/M2 | WEIGHT: 209 LBS

## 2024-07-15 DIAGNOSIS — R03.0 WHITE COAT SYNDROME WITHOUT DIAGNOSIS OF HYPERTENSION: ICD-10-CM

## 2024-07-15 DIAGNOSIS — O12.03 EDEMA DURING PREGNANCY IN THIRD TRIMESTER (HHS-HCC): Primary | ICD-10-CM

## 2024-07-15 PROCEDURE — 0501F PRENATAL FLOW SHEET: CPT | Performed by: OBSTETRICS & GYNECOLOGY

## 2024-07-16 ENCOUNTER — HOSPITAL ENCOUNTER (OUTPATIENT)
Dept: RADIOLOGY | Facility: CLINIC | Age: 29
Discharge: HOME | End: 2024-07-16
Payer: COMMERCIAL

## 2024-07-16 ENCOUNTER — LAB (OUTPATIENT)
Dept: LAB | Facility: LAB | Age: 29
End: 2024-07-16
Payer: COMMERCIAL

## 2024-07-16 DIAGNOSIS — O36.5930 MATERNAL CARE FOR OTHER KNOWN OR SUSPECTED POOR FETAL GROWTH, THIRD TRIMESTER, NOT APPLICABLE OR UNSPECIFIED (HHS-HCC): ICD-10-CM

## 2024-07-16 DIAGNOSIS — O12.03 EDEMA DURING PREGNANCY IN THIRD TRIMESTER (HHS-HCC): ICD-10-CM

## 2024-07-16 LAB
ALBUMIN SERPL BCP-MCNC: 3.4 G/DL (ref 3.4–5)
ALP SERPL-CCNC: 160 U/L (ref 33–110)
ALT SERPL W P-5'-P-CCNC: 12 U/L (ref 7–45)
ANION GAP SERPL CALC-SCNC: 14 MMOL/L (ref 10–20)
AST SERPL W P-5'-P-CCNC: 15 U/L (ref 9–39)
BILIRUB SERPL-MCNC: 0.3 MG/DL (ref 0–1.2)
BUN SERPL-MCNC: 7 MG/DL (ref 6–23)
CALCIUM SERPL-MCNC: 8.6 MG/DL (ref 8.6–10.3)
CHLORIDE SERPL-SCNC: 104 MMOL/L (ref 98–107)
CO2 SERPL-SCNC: 23 MMOL/L (ref 21–32)
CREAT SERPL-MCNC: 0.83 MG/DL (ref 0.5–1.05)
CREAT UR-MCNC: 60 MG/DL (ref 20–320)
EGFRCR SERPLBLD CKD-EPI 2021: >90 ML/MIN/1.73M*2
ERYTHROCYTE [DISTWIDTH] IN BLOOD BY AUTOMATED COUNT: 12.2 % (ref 11.5–14.5)
GLUCOSE SERPL-MCNC: 68 MG/DL (ref 74–99)
HCT VFR BLD AUTO: 35.8 % (ref 36–46)
HGB BLD-MCNC: 12.3 G/DL (ref 12–16)
MCH RBC QN AUTO: 30.1 PG (ref 26–34)
MCHC RBC AUTO-ENTMCNC: 34.4 G/DL (ref 32–36)
MCV RBC AUTO: 88 FL (ref 80–100)
NRBC BLD-RTO: 0 /100 WBCS (ref 0–0)
PLATELET # BLD AUTO: 255 X10*3/UL (ref 150–450)
POTASSIUM SERPL-SCNC: 4.4 MMOL/L (ref 3.5–5.3)
PROT SERPL-MCNC: 6 G/DL (ref 6.4–8.2)
PROT UR-ACNC: 8 MG/DL (ref 5–24)
PROT/CREAT UR: 0.13 MG/MG CREAT (ref 0–0.17)
RBC # BLD AUTO: 4.08 X10*6/UL (ref 4–5.2)
SODIUM SERPL-SCNC: 137 MMOL/L (ref 136–145)
WBC # BLD AUTO: 13.9 X10*3/UL (ref 4.4–11.3)

## 2024-07-16 PROCEDURE — 76816 OB US FOLLOW-UP PER FETUS: CPT

## 2024-07-16 PROCEDURE — 76816 OB US FOLLOW-UP PER FETUS: CPT | Performed by: OBSTETRICS & GYNECOLOGY

## 2024-07-16 PROCEDURE — 76819 FETAL BIOPHYS PROFIL W/O NST: CPT | Performed by: OBSTETRICS & GYNECOLOGY

## 2024-07-16 PROCEDURE — 76819 FETAL BIOPHYS PROFIL W/O NST: CPT

## 2024-07-16 PROCEDURE — 84156 ASSAY OF PROTEIN URINE: CPT

## 2024-07-16 PROCEDURE — 36415 COLL VENOUS BLD VENIPUNCTURE: CPT

## 2024-07-16 PROCEDURE — 80053 COMPREHEN METABOLIC PANEL: CPT

## 2024-07-16 PROCEDURE — 85027 COMPLETE CBC AUTOMATED: CPT

## 2024-07-16 PROCEDURE — 82570 ASSAY OF URINE CREATININE: CPT

## 2024-07-22 ENCOUNTER — APPOINTMENT (OUTPATIENT)
Dept: OBSTETRICS AND GYNECOLOGY | Facility: CLINIC | Age: 29
End: 2024-07-22
Payer: COMMERCIAL

## 2024-07-23 ENCOUNTER — APPOINTMENT (OUTPATIENT)
Dept: RADIOLOGY | Facility: HOSPITAL | Age: 29
End: 2024-07-23
Payer: COMMERCIAL

## 2024-07-24 ENCOUNTER — APPOINTMENT (OUTPATIENT)
Dept: OBSTETRICS AND GYNECOLOGY | Facility: CLINIC | Age: 29
End: 2024-07-24
Payer: COMMERCIAL

## 2024-07-24 VITALS — SYSTOLIC BLOOD PRESSURE: 129 MMHG | DIASTOLIC BLOOD PRESSURE: 81 MMHG | WEIGHT: 208 LBS | BODY MASS INDEX: 34.61 KG/M2

## 2024-07-24 DIAGNOSIS — Z34.03 ENCOUNTER FOR SUPERVISION OF NORMAL FIRST PREGNANCY, THIRD TRIMESTER (HHS-HCC): Primary | ICD-10-CM

## 2024-07-24 PROCEDURE — 0501F PRENATAL FLOW SHEET: CPT | Performed by: OBSTETRICS & GYNECOLOGY

## 2024-07-24 NOTE — PATIENT INSTRUCTIONS
DR. MCMAHON'S DELIVERY GUIDE    HOW SHOULD I PREPARE?  Think about what you want your delivery to be like and let your team know  Don't hesitate to speak up if you have concerns or questions  Stay mentally flexible - even with modern medicine, delivery can be unpredictable!  Make sure you have reliable and timely transportation to the hospital  Keep your gas tank at least ¼ full  Keep a towel in the car (to catch amniotic fluid if your water breaks)  Plan for childcare, weather-related traffic delays, etc.  Hospital bag suggestions: birth plan, phone chargers, personal toiletries, hair accessories, chewing gum, water bottle, personal pillow or blanket, things to help you relax during labor, change of clothes for postpartum, nursing bra, eye mask and ear plugs, flip flops for shower, personal towel for shower, baby clothes for going home (bring 1 size up and down from expected size)    WHEN SHOULD I CALL?  When your water breaks (can feel like a gush, or a non-stop trickle of fluid)  When your contractions have been happening regularly for at least 2 hours non-stop AND  First Delivery: Contractions are occurring every 5 minutes or less  Repeat Delivery/Planned : Contractions are occurring every 8 minutes or less  If the baby is moving less than usual   If you experience bleeding like a period  For any other symptoms that just doesn't feel “right” to you    WHO DO I CALL?  Call [phone] for the [facility] office and [phone] for the [place] office. During the day, ask the  to direct you to the office nurse. After hours, you will be directed to the doctor on Labor & Delivery by the answering service. Please make sure to tell the answering service which L&D location you plan to go to so they can contact the correct on call provider.    WHERE DO I DELIVER?  Low risk pregnancies have the option to deliver at any  hospital. High risk pregnancies should deliver at [facility]. Dr. Mcmahon  only delivers at [facility], typically on Thursday nights. To schedule a hospital tour, call [phone].    HOW DO I GET THERE?  [facility] at [facility]: [address] [place], [place]. This will take you to the entrance of [facility], which connects to [facility]. The parking garage is also closest to this address, and there is a  as well. Walk through [facility] towards the atrium, then [facility] will be on your right just past the [employer] shop. Labor and Delivery is located on the 2nd floor via elevator.   hospitals Women's and  [place] at [facility]: [address] [facility]. Entrance is located between the Emergency Department and the Main Hospital building. Look for the [employer] logo above the entrance. You can also enter from the entrance to the [facility] where there is a  - just turn right past the information desk and walk down the gallery fuller until you reach hospitals. Labor and Delivery is located on the 3rd floor via elevator.    WHO CAN BE WITH ME?  The visitor policy can be found online at https://www.[email or web address].org/healthcare-update/general-visitor-information.   Certified doulas do not count as visitors.     HOW CAN I DECREASE MY RISK FOR  DELIVERY?  Keep your body as strong and healthy as possible  Look through the Spinning Babies® website to understand how movement can help your baby get in the right place in the pelvis   Use professional birth support, such as a    Change positions frequently during labor  Stay well hydrated throughout your labor, including “clears” for caloric nourishment (honey water, apple juice, etc.)   Allow enough time for labor - it can take over 24 hours!  Rest when you can so you are mentally ready to push once you get to 10cm dilated  Try pushing in a different position if you're not making progress. Consider pushing on hands and knees, closed  knee pushing, use of a birthing bar, etc.     Delivery is a team effort. Please speak up if you don't feel included in the decision-making.       DR. VANG'S POSTPARTUM GUIDE    Going through all the physical and emotional changes of pregnancy is no small feat, and it's important to realize these changes continue even after delivery. Only about 50% of women go to their postpartum visits, which means a lot of women are missing out on an opportunity to check on their own health, receive support, and ask important questions. Every woman should have a check-up 4-6 weeks after their delivery, and some women will need to be seen even sooner than that.    We highly encourage you to take your postpartum care just as seriously as your prenatal care. Many serious long-term health issues related to pregnancy actually happen postpartum. Our goal is to help you feel supported, capable, and safe as you navigate your unique post-pregnancy journey.    SAFETY  You should immediately contact the doctor's office if you experience any of the following:  Chest pain or trouble breathing  Blood pressures >150/100, that is just as high or higher when repeated 20 minutes later  Severe abdominal pain not responding to your discharge pain medications  Heavy bleeding fully soaking a pad in under 30 minutes or soiling your clothes   Chills, shakes, or fever >100.4°F  Suicidal thoughts  You should also call anytime you just don't feel right - it is always better to be safe than sorry!    COMMON POSTPARTUM CONCERNS  A variety of other postpartum issues are just as harmful to a woman's wellbeing and health, even if they are not deadly. Please call for an appointment if you have concerns about the following, or any other bothersome issue:  Breastfeeding difficulties  Mood changes such as depression or anxiety  Pain during sex or with activity  Abnormal uterine bleeding past 6-8 weeks postpartum  You are not alone, and we are here to help  you!    WHAT TO EXPECT AT YOUR POSTPARTUM VISIT  During your postpartum visit, we will ask you questions about your pregnancy complications, postpartum mood and support, infant feeding journey, contraceptive plans, and more. If your concerns require a more in-depth evaluation, we may ask you to come back for a follow-up visit. Certain follow-up visits may be billed outside of your insurance's OB global package.     POSTPARTUM SUPPORT RESOURCES    [facility] POSTPARTUM PROGRAMMING  https://www.\[facility] [place] [facility]\"".org/services/obgy-[facility]/patient-resources/classes-and-support   [phone]  Free parenting support offered via “Mommy and Baby Too!” peer groups and WIC-lead “Healthy Mom and Baby” programs.    [facility] LACTATION SUPPORT  http://www.\[facility] [place] [facility]\"".org/gavino/health-and-wellness/pregnancy-resources/lactation-services  [phone]  In person and virtual appointments offered at multiple locations for breastfeeding support.    BREASTFEEDING MEDICINE [facility]  https://Zenkars/  [phone]. Provides full spectrum breastfeeding assistance. Located in the [facility] building, but open to parents seeing other pediatricians.    [facility] PELVIC FLOOR PHYSICAL THERAPY  https://www.\[facility] [place] [facility]\"".org/services/obgy-[facility]/female-pelvic-health/conditions-and-treatments/emiejt-tnuak-jedyvibuxmzyoe  [phone]  Licensed female therapists help with a variety of postpartum pelvic floor concerns, including pain, weakness, incontinence, and more. Referral may be required.     [facility] WOMEN'S MENTAL HEALTH CLINIC  https://www.\[facility] [place] [facility]\"".org/services/psychiatry/conditions-treatments/womens-mental-health   [phone], ask for “Women's Mental Health” providers for help with postpartum anxiety, depression, OCD, PTSD, and more.    HELP ME GROW  http://www.helpmegrow.[place].gov/  Help Me Grow is an evidence-based program that promotes healthy  growth and development for babies and young children by providing professional support in the home for pregnant mothers or new parents. You can self-refer through the website or ask your doctor to make a referral.    LOOKING FOR PROFESSIONAL POSTPARTUM SUPPORT?  CEDRIC [employer] ( Certifying Organization)  https://www.cedric.org/  Type in your zip code to find a certified postpartum  near you    [employer]  http://www.birthBaystate Franklin Medical CenterThe ADEX.org/    THE [facility]  http://www.[employer].Snip.ly/    NURCharacter BoosterD Check   https://[email or web address].Snip.ly/

## 2024-07-24 NOTE — PROGRESS NOTES
GBS neg  BP rising but not meeting diagnostic criteria for any hypertensive disorder of pregnancy at this time. PEC labs sent last week were negative. No sx of PEC.    IOL got moved up to 39 weeks at 7/29.    Corrine Mcmahon MD

## 2024-07-29 ENCOUNTER — ANESTHESIA (OUTPATIENT)
Dept: OBSTETRICS AND GYNECOLOGY | Facility: HOSPITAL | Age: 29
End: 2024-07-29
Payer: COMMERCIAL

## 2024-07-29 ENCOUNTER — HOSPITAL ENCOUNTER (INPATIENT)
Facility: HOSPITAL | Age: 29
LOS: 3 days | Discharge: HOME | End: 2024-08-01
Attending: OBSTETRICS & GYNECOLOGY | Admitting: OBSTETRICS & GYNECOLOGY
Payer: COMMERCIAL

## 2024-07-29 ENCOUNTER — ANESTHESIA EVENT (OUTPATIENT)
Dept: OBSTETRICS AND GYNECOLOGY | Facility: HOSPITAL | Age: 29
End: 2024-07-29
Payer: COMMERCIAL

## 2024-07-29 ENCOUNTER — APPOINTMENT (OUTPATIENT)
Dept: OBSTETRICS AND GYNECOLOGY | Facility: HOSPITAL | Age: 29
End: 2024-07-29
Payer: COMMERCIAL

## 2024-07-29 ENCOUNTER — APPOINTMENT (OUTPATIENT)
Dept: OBSTETRICS AND GYNECOLOGY | Facility: CLINIC | Age: 29
End: 2024-07-29
Payer: COMMERCIAL

## 2024-07-29 DIAGNOSIS — R03.0 WHITE COAT SYNDROME WITHOUT DIAGNOSIS OF HYPERTENSION: ICD-10-CM

## 2024-07-29 PROBLEM — Z3A.39 39 WEEKS GESTATION OF PREGNANCY (HHS-HCC): Status: ACTIVE | Noted: 2024-07-29

## 2024-07-29 LAB
ABO GROUP (TYPE) IN BLOOD: NORMAL
ANTIBODY SCREEN: NORMAL
ERYTHROCYTE [DISTWIDTH] IN BLOOD BY AUTOMATED COUNT: 12.3 % (ref 11.5–14.5)
HCT VFR BLD AUTO: 37.7 % (ref 36–46)
HGB BLD-MCNC: 12.9 G/DL (ref 12–16)
MCH RBC QN AUTO: 30.1 PG (ref 26–34)
MCHC RBC AUTO-ENTMCNC: 34.2 G/DL (ref 32–36)
MCV RBC AUTO: 88 FL (ref 80–100)
NRBC BLD-RTO: 0 /100 WBCS (ref 0–0)
PLATELET # BLD AUTO: 292 X10*3/UL (ref 150–450)
RBC # BLD AUTO: 4.28 X10*6/UL (ref 4–5.2)
RH FACTOR (ANTIGEN D): NORMAL
TREPONEMA PALLIDUM IGG+IGM AB [PRESENCE] IN SERUM OR PLASMA BY IMMUNOASSAY: NONREACTIVE
WBC # BLD AUTO: 14.7 X10*3/UL (ref 4.4–11.3)

## 2024-07-29 PROCEDURE — 7100000016 HC LABOR RECOVERY PER HOUR

## 2024-07-29 PROCEDURE — 86780 TREPONEMA PALLIDUM: CPT

## 2024-07-29 PROCEDURE — 2500000005 HC RX 250 GENERAL PHARMACY W/O HCPCS: Performed by: ANESTHESIOLOGIST ASSISTANT

## 2024-07-29 PROCEDURE — 2500000004 HC RX 250 GENERAL PHARMACY W/ HCPCS (ALT 636 FOR OP/ED): Performed by: STUDENT IN AN ORGANIZED HEALTH CARE EDUCATION/TRAINING PROGRAM

## 2024-07-29 PROCEDURE — 86901 BLOOD TYPING SEROLOGIC RH(D): CPT

## 2024-07-29 PROCEDURE — 01967 NEURAXL LBR ANES VAG DLVR: CPT | Performed by: ANESTHESIOLOGIST ASSISTANT

## 2024-07-29 PROCEDURE — 2500000004 HC RX 250 GENERAL PHARMACY W/ HCPCS (ALT 636 FOR OP/ED)

## 2024-07-29 PROCEDURE — 3700000014 EPIDURAL BLOCK: Performed by: ANESTHESIOLOGIST ASSISTANT

## 2024-07-29 PROCEDURE — 7210000002 HC LABOR PER HOUR

## 2024-07-29 PROCEDURE — 10907ZC DRAINAGE OF AMNIOTIC FLUID, THERAPEUTIC FROM PRODUCTS OF CONCEPTION, VIA NATURAL OR ARTIFICIAL OPENING: ICD-10-PCS | Performed by: OBSTETRICS & GYNECOLOGY

## 2024-07-29 PROCEDURE — 85027 COMPLETE CBC AUTOMATED: CPT

## 2024-07-29 PROCEDURE — 59050 FETAL MONITOR W/REPORT: CPT

## 2024-07-29 PROCEDURE — 3E033VJ INTRODUCTION OF OTHER HORMONE INTO PERIPHERAL VEIN, PERCUTANEOUS APPROACH: ICD-10-PCS | Performed by: OBSTETRICS & GYNECOLOGY

## 2024-07-29 PROCEDURE — 01967 NEURAXL LBR ANES VAG DLVR: CPT | Performed by: ANESTHESIOLOGY

## 2024-07-29 PROCEDURE — 99222 1ST HOSP IP/OBS MODERATE 55: CPT

## 2024-07-29 PROCEDURE — 1120000001 HC OB PRIVATE ROOM DAILY

## 2024-07-29 PROCEDURE — 36415 COLL VENOUS BLD VENIPUNCTURE: CPT

## 2024-07-29 PROCEDURE — 51701 INSERT BLADDER CATHETER: CPT

## 2024-07-29 RX ORDER — OXYTOCIN 10 [USP'U]/ML
10 INJECTION, SOLUTION INTRAMUSCULAR; INTRAVENOUS ONCE AS NEEDED
Status: DISCONTINUED | OUTPATIENT
Start: 2024-07-29 | End: 2024-07-30 | Stop reason: HOSPADM

## 2024-07-29 RX ORDER — OXYTOCIN/0.9 % SODIUM CHLORIDE 30/500 ML
2-30 PLASTIC BAG, INJECTION (ML) INTRAVENOUS CONTINUOUS
Status: DISCONTINUED | OUTPATIENT
Start: 2024-07-29 | End: 2024-07-30

## 2024-07-29 RX ORDER — ONDANSETRON HYDROCHLORIDE 2 MG/ML
4 INJECTION, SOLUTION INTRAVENOUS EVERY 6 HOURS PRN
Status: DISCONTINUED | OUTPATIENT
Start: 2024-07-29 | End: 2024-07-30

## 2024-07-29 RX ORDER — METOCLOPRAMIDE 10 MG/1
10 TABLET ORAL EVERY 6 HOURS PRN
Status: DISCONTINUED | OUTPATIENT
Start: 2024-07-29 | End: 2024-07-30

## 2024-07-29 RX ORDER — HYDRALAZINE HYDROCHLORIDE 20 MG/ML
5 INJECTION INTRAMUSCULAR; INTRAVENOUS ONCE AS NEEDED
Status: DISCONTINUED | OUTPATIENT
Start: 2024-07-29 | End: 2024-07-30 | Stop reason: HOSPADM

## 2024-07-29 RX ORDER — OXYTOCIN/0.9 % SODIUM CHLORIDE 30/500 ML
60 PLASTIC BAG, INJECTION (ML) INTRAVENOUS ONCE AS NEEDED
Status: DISCONTINUED | OUTPATIENT
Start: 2024-07-29 | End: 2024-07-30 | Stop reason: HOSPADM

## 2024-07-29 RX ORDER — TERBUTALINE SULFATE 1 MG/ML
0.25 INJECTION SUBCUTANEOUS ONCE AS NEEDED
Status: DISCONTINUED | OUTPATIENT
Start: 2024-07-29 | End: 2024-07-30 | Stop reason: HOSPADM

## 2024-07-29 RX ORDER — METOCLOPRAMIDE HYDROCHLORIDE 5 MG/ML
10 INJECTION INTRAMUSCULAR; INTRAVENOUS EVERY 6 HOURS PRN
Status: DISCONTINUED | OUTPATIENT
Start: 2024-07-29 | End: 2024-07-30

## 2024-07-29 RX ORDER — TRANEXAMIC ACID 100 MG/ML
1000 INJECTION, SOLUTION INTRAVENOUS ONCE AS NEEDED
Status: DISCONTINUED | OUTPATIENT
Start: 2024-07-29 | End: 2024-07-30 | Stop reason: HOSPADM

## 2024-07-29 RX ORDER — METHYLERGONOVINE MALEATE 0.2 MG/ML
0.2 INJECTION INTRAVENOUS ONCE AS NEEDED
Status: DISCONTINUED | OUTPATIENT
Start: 2024-07-29 | End: 2024-07-30 | Stop reason: HOSPADM

## 2024-07-29 RX ORDER — SODIUM CHLORIDE, SODIUM LACTATE, POTASSIUM CHLORIDE, CALCIUM CHLORIDE 600; 310; 30; 20 MG/100ML; MG/100ML; MG/100ML; MG/100ML
125 INJECTION, SOLUTION INTRAVENOUS CONTINUOUS
Status: DISCONTINUED | OUTPATIENT
Start: 2024-07-29 | End: 2024-07-30

## 2024-07-29 RX ORDER — TERBUTALINE SULFATE 1 MG/ML
INJECTION SUBCUTANEOUS
Status: COMPLETED
Start: 2024-07-29 | End: 2024-07-29

## 2024-07-29 RX ORDER — FENTANYL/BUPIVACAINE/NS/PF 2MCG/ML-.1
PLASTIC BAG, INJECTION (ML) INJECTION AS NEEDED
Status: DISCONTINUED | OUTPATIENT
Start: 2024-07-29 | End: 2024-07-30

## 2024-07-29 RX ORDER — FENTANYL/BUPIVACAINE/NS/PF 2MCG/ML-.1
PLASTIC BAG, INJECTION (ML) INJECTION CONTINUOUS PRN
Status: DISCONTINUED | OUTPATIENT
Start: 2024-07-29 | End: 2024-07-30

## 2024-07-29 RX ORDER — LABETALOL HYDROCHLORIDE 5 MG/ML
20 INJECTION, SOLUTION INTRAVENOUS ONCE AS NEEDED
Status: DISCONTINUED | OUTPATIENT
Start: 2024-07-29 | End: 2024-07-30 | Stop reason: HOSPADM

## 2024-07-29 RX ORDER — ONDANSETRON 4 MG/1
4 TABLET, FILM COATED ORAL EVERY 6 HOURS PRN
Status: DISCONTINUED | OUTPATIENT
Start: 2024-07-29 | End: 2024-07-30

## 2024-07-29 RX ORDER — NIFEDIPINE 10 MG/1
10 CAPSULE ORAL ONCE AS NEEDED
Status: DISCONTINUED | OUTPATIENT
Start: 2024-07-29 | End: 2024-07-30 | Stop reason: HOSPADM

## 2024-07-29 RX ORDER — CARBOPROST TROMETHAMINE 250 UG/ML
250 INJECTION, SOLUTION INTRAMUSCULAR ONCE AS NEEDED
Status: DISCONTINUED | OUTPATIENT
Start: 2024-07-29 | End: 2024-07-30 | Stop reason: HOSPADM

## 2024-07-29 RX ORDER — MISOPROSTOL 200 UG/1
800 TABLET ORAL ONCE AS NEEDED
Status: DISCONTINUED | OUTPATIENT
Start: 2024-07-29 | End: 2024-07-30 | Stop reason: HOSPADM

## 2024-07-29 RX ORDER — LIDOCAINE HYDROCHLORIDE 10 MG/ML
30 INJECTION INFILTRATION; PERINEURAL ONCE AS NEEDED
Status: DISCONTINUED | OUTPATIENT
Start: 2024-07-29 | End: 2024-07-30 | Stop reason: HOSPADM

## 2024-07-29 RX ORDER — LOPERAMIDE HYDROCHLORIDE 2 MG/1
4 CAPSULE ORAL EVERY 2 HOUR PRN
Status: DISCONTINUED | OUTPATIENT
Start: 2024-07-29 | End: 2024-07-30 | Stop reason: HOSPADM

## 2024-07-29 SDOH — ECONOMIC STABILITY: FOOD INSECURITY: WITHIN THE PAST 12 MONTHS, THE FOOD YOU BOUGHT JUST DIDN'T LAST AND YOU DIDN'T HAVE MONEY TO GET MORE.: NEVER TRUE

## 2024-07-29 SDOH — ECONOMIC STABILITY: TRANSPORTATION INSECURITY: IN THE PAST 12 MONTHS, HAS LACK OF TRANSPORTATION KEPT YOU FROM MEDICAL APPOINTMENTS OR FROM GETTING MEDICATIONS?: NO

## 2024-07-29 SDOH — ECONOMIC STABILITY: HOUSING INSECURITY: DO YOU FEEL UNSAFE GOING BACK TO THE PLACE WHERE YOU ARE LIVING?: NO

## 2024-07-29 SDOH — SOCIAL STABILITY: SOCIAL INSECURITY: HAS ANYONE EVER THREATENED TO HURT YOUR FAMILY OR YOUR PETS?: NO

## 2024-07-29 SDOH — SOCIAL STABILITY: SOCIAL INSECURITY: HAVE YOU HAD ANY THOUGHTS OF HARMING ANYONE ELSE?: NO

## 2024-07-29 SDOH — ECONOMIC STABILITY: FOOD INSECURITY: WITHIN THE PAST 12 MONTHS, YOU WORRIED THAT YOUR FOOD WOULD RUN OUT BEFORE YOU GOT MONEY TO BUY MORE.: NEVER TRUE

## 2024-07-29 SDOH — ECONOMIC STABILITY: TRANSPORTATION INSECURITY

## 2024-07-29 SDOH — HEALTH STABILITY: MENTAL HEALTH: SUICIDAL BEHAVIOR (LIFETIME): NO

## 2024-07-29 SDOH — SOCIAL STABILITY: SOCIAL INSECURITY: DO YOU FEEL ANYONE HAS EXPLOITED OR TAKEN ADVANTAGE OF YOU FINANCIALLY OR OF YOUR PERSONAL PROPERTY?: NO

## 2024-07-29 SDOH — SOCIAL STABILITY: SOCIAL INSECURITY: ABUSE SCREEN: ADULT

## 2024-07-29 SDOH — HEALTH STABILITY: MENTAL HEALTH: WISH TO BE DEAD (PAST 1 MONTH): NO

## 2024-07-29 SDOH — SOCIAL STABILITY: SOCIAL INSECURITY: DOES ANYONE TRY TO KEEP YOU FROM HAVING/CONTACTING OTHER FRIENDS OR DOING THINGS OUTSIDE YOUR HOME?: NO

## 2024-07-29 SDOH — ECONOMIC STABILITY: FOOD INSECURITY

## 2024-07-29 SDOH — ECONOMIC STABILITY: TRANSPORTATION INSECURITY
IN THE PAST 12 MONTHS, HAS THE LACK OF TRANSPORTATION KEPT YOU FROM MEDICAL APPOINTMENTS OR FROM GETTING MEDICATIONS?: NO

## 2024-07-29 SDOH — HEALTH STABILITY: MENTAL HEALTH: HAVE YOU USED ANY PRESCRIPTION DRUGS OTHER THAN PRESCRIBED IN THE PAST 12 MONTHS?: NO

## 2024-07-29 SDOH — HEALTH STABILITY: MENTAL HEALTH: NON-SPECIFIC ACTIVE SUICIDAL THOUGHTS (PAST 1 MONTH): NO

## 2024-07-29 SDOH — SOCIAL STABILITY: SOCIAL INSECURITY: HAVE YOU HAD THOUGHTS OF HARMING ANYONE ELSE?: NO

## 2024-07-29 SDOH — SOCIAL STABILITY: SOCIAL INSECURITY: ARE THERE ANY APPARENT SIGNS OF INJURIES/BEHAVIORS THAT COULD BE RELATED TO ABUSE/NEGLECT?: NO

## 2024-07-29 SDOH — ECONOMIC STABILITY: TRANSPORTATION INSECURITY
IN THE PAST 12 MONTHS, HAS LACK OF TRANSPORTATION KEPT YOU FROM MEETINGS, WORK, OR FROM GETTING THINGS NEEDED FOR DAILY LIVING?: NO

## 2024-07-29 SDOH — SOCIAL STABILITY: SOCIAL INSECURITY: PHYSICAL ABUSE: DENIES

## 2024-07-29 SDOH — HEALTH STABILITY: MENTAL HEALTH: HAVE YOU USED ANY SUBSTANCES (CANABIS, COCAINE, HEROIN, HALLUCINOGENS, INHALANTS, ETC.) IN THE PAST 12 MONTHS?: NO

## 2024-07-29 SDOH — ECONOMIC STABILITY: FOOD INSECURITY: WITHIN THE PAST 12 MONTHS, YOU WORRIED THAT YOUR FOOD WOULD RUN OUT BEFORE YOU GOT THE MONEY TO BUY MORE.: NEVER TRUE

## 2024-07-29 SDOH — SOCIAL STABILITY: SOCIAL INSECURITY: VERBAL ABUSE: DENIES

## 2024-07-29 SDOH — HEALTH STABILITY: MENTAL HEALTH: WERE YOU ABLE TO COMPLETE ALL THE BEHAVIORAL HEALTH SCREENINGS?: YES

## 2024-07-29 SDOH — SOCIAL STABILITY: SOCIAL INSECURITY: ARE YOU OR HAVE YOU BEEN THREATENED OR ABUSED PHYSICALLY, EMOTIONALLY, OR SEXUALLY BY ANYONE?: NO

## 2024-07-29 ASSESSMENT — PAIN SCALES - GENERAL
PAINLEVEL_OUTOF10: 0 - NO PAIN

## 2024-07-29 ASSESSMENT — LIFESTYLE VARIABLES
HOW MANY STANDARD DRINKS CONTAINING ALCOHOL DO YOU HAVE ON A TYPICAL DAY: PATIENT DOES NOT DRINK
AUDIT-C TOTAL SCORE: 0
HOW OFTEN DO YOU HAVE A DRINK CONTAINING ALCOHOL: NEVER
SKIP TO QUESTIONS 9-10: 1
AUDIT-C TOTAL SCORE: 0
HOW OFTEN DO YOU HAVE 6 OR MORE DRINKS ON ONE OCCASION: NEVER

## 2024-07-29 ASSESSMENT — PATIENT HEALTH QUESTIONNAIRE - PHQ9
1. LITTLE INTEREST OR PLEASURE IN DOING THINGS: NOT AT ALL
SUM OF ALL RESPONSES TO PHQ9 QUESTIONS 1 & 2: 0
2. FEELING DOWN, DEPRESSED OR HOPELESS: NOT AT ALL

## 2024-07-29 ASSESSMENT — ACTIVITIES OF DAILY LIVING (ADL): LACK_OF_TRANSPORTATION: NO

## 2024-07-29 NOTE — ANESTHESIA PREPROCEDURE EVALUATION
Patient: Aron Salinas    Evaluation Method: In-person visit    Procedure Information    Date: 07/29/24  Procedure: Labor Consult         Relevant Problems   Cardiac (within normal limits)      Pulmonary (within normal limits)      Neuro (within normal limits)      GI (within normal limits)      /Renal (within normal limits)      Liver (within normal limits)      Endocrine (within normal limits)      Hematology (within normal limits)      Musculoskeletal (within normal limits)      HEENT (within normal limits)      GYN   (+) 39 weeks gestation of pregnancy ([facility])       Clinical information reviewed:    Allergies                NPO Detail:  No data recorded     OB/Gyn Evaluation    Present Pregnancy    (+) , fetal growth restriction   Obstetric History                Physical Exam    Airway  Mallampati: I  TM distance: >3 FB  Neck ROM: full     Cardiovascular    Dental    Pulmonary    Abdominal            Anesthesia Plan    History of general anesthesia?: no  History of complications of general anesthesia?: no    ASA 2   (GA and epidural R/B discussed.  Questions welcomed.  Pt agrees with plan.  NO family issues with anesthesia, no social history)  Anesthetic plan and risks discussed with patient.  Use of blood products discussed with patient who consented to blood products.    Plan discussed with CAA and attending.

## 2024-07-29 NOTE — ANESTHESIA PREPROCEDURE EVALUATION
Patient: Aron Salinas    Evaluation Method: In-person visit    Procedure Information    Date: 07/29/24  Procedure: Labor Consult         Relevant Problems   Cardiac (within normal limits)      Pulmonary (within normal limits)      Neuro (within normal limits)      GI (within normal limits)      /Renal (within normal limits)      Liver (within normal limits)      Endocrine (within normal limits)      Hematology (within normal limits)      Musculoskeletal (within normal limits)      HEENT (within normal limits)       Clinical information reviewed:    Allergies                NPO Detail:  No data recorded     OB/Gyn Evaluation    Present Pregnancy    (+) , fetal growth restriction   Obstetric History                Physical Exam    Airway  Mallampati: I  TM distance: >3 FB  Neck ROM: full     Cardiovascular    Dental    Pulmonary    Abdominal            Anesthesia Plan    History of general anesthesia?: no  History of complications of general anesthesia?: no    (GA R/B discussed.  Questions welcomed.  Pt agrees with plan.  NO family issues with anesthesia, no social history)  Anesthetic plan and risks discussed with patient.  Use of blood products discussed with patient who consented to blood products.    Plan discussed with CAA and attending.

## 2024-07-29 NOTE — H&P
Obstetrical Admission History and Physical    Assessment/Plan    Aron Salinas is a 28 y.o.  at 39w0d, JENNIFER: 2024, by Last Menstrual Period c/w 12w1d US admitted for term induction.     IOL  Admitted, consented, scanned, cephalic with Dr Atkinson  Plan to update with CRB/pit given prolonged decel, see significant event note  Epidural as requested  GBS negative  EFW 3217 (65%) with AC 89% on   CEFM, currently Cat II for prolonged decel s/p terb administration, see significant event note previously and now reassuring with accels and good variability and baseline, will monitor closely   Delivery plan: patient desires vaginal delivery, patient counseled on risks of labor, vaginal delivery, and possibility of C/S for varying maternal or fetal indications    Pregnancy notables:   White coat syndrome with elevated BP in clinic visits without elevated BP at home or concern for PEC; HELLP labs neg x2 outpatient  History of uterine fibroids    Postpartum planning:  Contraception: ppIUD  Feeding: breastfeeding    Patient seen and discussed with Dr. Andrae Tellez MD PGY-1   OBGYN    Subjective   Pt was resting comfortably in bed. Overall having concerns about how things are going to progress after prolonged decel requiring terb. Would like to err on the side of caution for going back for pCS and would like to get epidural now in anticipation and strong desire to be awake in the event of CS. Pt would like a ppIUD. Denies HA, CP, SOB, RUQ pain and vision changes. Otherwise doing well, no other complaints at this time.     Pregnancy notable for:  Medical Problems       Problem List       Family history of Down syndrome    Overview Addendum 2024  9:01 AM by Corrine Mcmahon MD     Brother with T21           White coat syndrome without diagnosis of hypertension    Overview Addendum 2024  9:01 AM by Corrine Mcmahon MD     /80s in first trimester  Baseline PCR 50, HELLP neg  No prior dx CHTN                  Obstetrical History   OB History    Para Term  AB Living   1             SAB IAB Ectopic Multiple Live Births                  # Outcome Date GA Lbr Yonny/2nd Weight Sex Type Anes PTL Lv   1 Current                Past Medical History  No past medical history on file.     Past Surgical History   No past surgical history on file.    Social History  Social History     Tobacco Use    Smoking status: Never    Smokeless tobacco: Never   Substance Use Topics    Alcohol use: Not Currently     Substance and Sexual Activity   Drug Use Not Currently       Allergies  Penicillins and Sulfa (sulfonamide antibiotics)     Medications  Medications Prior to Admission   Medication Sig Dispense Refill Last Dose    aspirin 81 mg chewable tablet Chew 2 tablets (162 mg) once daily. Start at 12 weeks EGA to reduce risk of preeclampsia. 90 tablet 2 2024    blood pressure monitor (Blood Pressure Kit) kit 1 kit 2 times a day. Log and bring to next OB apptmt If all <130/80, ok to stop after 1 week of checking 1 kit 0 2024    PNV 3-IRON FUM,GLUC-FOLIC ACID ORAL Take 1 tablet by mouth once daily.   2024    prenatal vitamin, iron-folic, 27 mg iron-800 mcg folic acid tablet Take 1 tablet by mouth once daily. 90 tablet 3 2024       Objective    Last Vitals  Temp Pulse Resp BP MAP O2 Sat   36.6 °C (97.9 °F) 89 18 131/83   100 %     Physical Examination  General: no acute distress  HEENT: normocephalic, atraumatic  Heart: warm and well perfused  Lungs: breathing comfortably on room air  Abdomen: gravid  Extremities: moving all extremities  Neuro: awake and conversant  Psych: appropriate mood and affect  SVE: deferred at this time  FHT: 140/mod/+accel/+ 8 min decel  [place]: quiet s/p terb  BSUS: cephalic with Dr Atkinson, suspicion for oligo but unable to formally measure ANGELI    Lab Review  Labs in chart have been reviewed.  Hb 12.3  HELLP neg x1, P:C 0.13  Admission orders pending

## 2024-07-29 NOTE — SIGNIFICANT EVENT
Prolonged decel     29 yo  at 39w0d by LMP c/w 12w1d US admitted for term IOL. On arrival to L&D FHT reassuring, cat I: 115/mod/+accel/-decel. Prior to start of induction, at around 1805, pt had a prolonged contraction and subsequently a prolonged decel with a saurabh in the 50s with a duration of about 10 minutes. Terbutaline given IM. FHT recovered to 135/mod/+accel/-decel, now cat I.    Had lengthy discussion with pt, her partner and her mother about significance of prolonged fetal decel. Discussed options to attempt IOL vs proceeding with pCS. Anesthesia at bedside and also discussed pros/cons of early epidural and risk of C/S under GETA. Pt to consider options then will be back to discuss. If proceeding with IOL, likely would start with CRB alone.    Evaluated with Dr. Abebe at the bedside.    Marilin Atkinson MD, PGY-4

## 2024-07-29 NOTE — ANESTHESIA PROCEDURE NOTES
Epidural Block    Patient location during procedure: OB  Start time: 7/29/2024 7:24 PM  Reason for block: labor analgesia  Staffing  Performed: [facility]   Authorized by: HARLAN Laguerre    Performed by: HARLAN Laguerre    Preanesthetic Checklist  Completed: patient identified, IV checked, risks and benefits discussed, surgical consent, monitors and equipment checked, pre-op evaluation, timeout performed and sterile techniques followed  Block Timeout  RN/Licensed healthcare professional reads aloud to the Anesthesia provider and entire team: Patient identity, procedure with side and site, patient position, and as applicable the availability of implants/special equipment/special requirements.  Patient on coagulant treatment: no  Timeout performed at: 7/29/2024 7:24 PM  Block Placement  Patient position: sitting  Prep: ChloraPrep  Sterility prep: cap, drape, gloves and mask  Sedation level: no sedation  Patient monitoring: blood pressure, continuous pulse oximetry and heart rate  Approach: midline  Local numbing: lidocaine 1% to skin and subcutaneous tissues  Vertebral space: lumbar  Lumbar location: L3-L4  Epidural  Loss of resistance technique: saline  Guidance: landmark technique        Needle  Needle type: Tuohy   Needle gauge: 17  Needle insertion depth: 6 cm  Catheter type: multi-orifice  Catheter size: 19 G  Catheter at skin depth: 11 cm  Catheter securement method: clear occlusive dressing and liquid medical adhesive    Test dose: lidocaine 1.5% with epinephrine 1-to-200,000  Test dose: lidocaine 1.5% with epinephrine 1-to-200,000  Test dose result: no positive test dose    PCEA  Medication concentration used: 0.044% Bupivacaine with 1.25 mcg/mL Fentanyl and [id number] Epinephrine  Dose (mL): 10  Lockout (minutes): 15  1-Hour Limit (boluses/hr): 4  Basal Rate: 14        Assessment  Number of attempts: 1  Events: no positive test dose  Procedure assessment: patient tolerated procedure well with no immediate  complications  Additional Notes  1 attempt, easy placement

## 2024-07-29 NOTE — CARE PLAN
The patient's goals for the shift include  have a healthy and safe delivery    The clinical goals for the shift include manage pain and anxiety through labor, have a healthy baby

## 2024-07-30 PROCEDURE — 1100000001 HC PRIVATE ROOM DAILY

## 2024-07-30 PROCEDURE — 51701 INSERT BLADDER CATHETER: CPT

## 2024-07-30 PROCEDURE — 1120000001 HC OB PRIVATE ROOM DAILY

## 2024-07-30 PROCEDURE — 7210000002 HC LABOR PER HOUR

## 2024-07-30 PROCEDURE — 0UH97HZ INSERTION OF CONTRACEPTIVE DEVICE INTO UTERUS, VIA NATURAL OR ARTIFICIAL OPENING: ICD-10-PCS | Performed by: SPECIALIST

## 2024-07-30 PROCEDURE — 0KQM0ZZ REPAIR PERINEUM MUSCLE, OPEN APPROACH: ICD-10-PCS | Performed by: SPECIALIST

## 2024-07-30 PROCEDURE — 58300 INSERT INTRAUTERINE DEVICE: CPT

## 2024-07-30 PROCEDURE — 2500000005 HC RX 250 GENERAL PHARMACY W/O HCPCS

## 2024-07-30 PROCEDURE — 2500000004 HC RX 250 GENERAL PHARMACY W/ HCPCS (ALT 636 FOR OP/ED)

## 2024-07-30 PROCEDURE — 2500000005 HC RX 250 GENERAL PHARMACY W/O HCPCS: Performed by: STUDENT IN AN ORGANIZED HEALTH CARE EDUCATION/TRAINING PROGRAM

## 2024-07-30 PROCEDURE — 2500000001 HC RX 250 WO HCPCS SELF ADMINISTERED DRUGS (ALT 637 FOR MEDICARE OP)

## 2024-07-30 PROCEDURE — 2500000005 HC RX 250 GENERAL PHARMACY W/O HCPCS: Performed by: ANESTHESIOLOGIST ASSISTANT

## 2024-07-30 PROCEDURE — 59400 OBSTETRICAL CARE: CPT

## 2024-07-30 PROCEDURE — 59409 OBSTETRICAL CARE: CPT | Performed by: SPECIALIST

## 2024-07-30 RX ORDER — MINERAL OIL
OIL (ML) ORAL
Status: DISPENSED
Start: 2024-07-30 | End: 2024-07-31

## 2024-07-30 RX ORDER — ONDANSETRON 4 MG/1
4 TABLET, FILM COATED ORAL EVERY 6 HOURS PRN
Status: DISCONTINUED | OUTPATIENT
Start: 2024-07-30 | End: 2024-08-01 | Stop reason: HOSPADM

## 2024-07-30 RX ORDER — DIPHENHYDRAMINE HCL 25 MG
25 CAPSULE ORAL EVERY 6 HOURS PRN
Status: DISCONTINUED | OUTPATIENT
Start: 2024-07-30 | End: 2024-08-01 | Stop reason: HOSPADM

## 2024-07-30 RX ORDER — HYDRALAZINE HYDROCHLORIDE 20 MG/ML
5 INJECTION INTRAMUSCULAR; INTRAVENOUS ONCE AS NEEDED
Status: DISCONTINUED | OUTPATIENT
Start: 2024-07-30 | End: 2024-08-01 | Stop reason: HOSPADM

## 2024-07-30 RX ORDER — ONDANSETRON HYDROCHLORIDE 2 MG/ML
4 INJECTION, SOLUTION INTRAVENOUS EVERY 6 HOURS PRN
Status: DISCONTINUED | OUTPATIENT
Start: 2024-07-30 | End: 2024-08-01 | Stop reason: HOSPADM

## 2024-07-30 RX ORDER — LABETALOL HYDROCHLORIDE 5 MG/ML
20 INJECTION, SOLUTION INTRAVENOUS ONCE AS NEEDED
Status: DISCONTINUED | OUTPATIENT
Start: 2024-07-30 | End: 2024-08-01 | Stop reason: HOSPADM

## 2024-07-30 RX ORDER — DIPHENHYDRAMINE HYDROCHLORIDE 50 MG/ML
25 INJECTION INTRAMUSCULAR; INTRAVENOUS EVERY 6 HOURS PRN
Status: DISCONTINUED | OUTPATIENT
Start: 2024-07-30 | End: 2024-08-01 | Stop reason: HOSPADM

## 2024-07-30 RX ORDER — ADHESIVE BANDAGE
10 BANDAGE TOPICAL
Status: DISCONTINUED | OUTPATIENT
Start: 2024-07-30 | End: 2024-08-01 | Stop reason: HOSPADM

## 2024-07-30 RX ORDER — NIFEDIPINE 10 MG/1
10 CAPSULE ORAL ONCE AS NEEDED
Status: DISCONTINUED | OUTPATIENT
Start: 2024-07-30 | End: 2024-08-01 | Stop reason: HOSPADM

## 2024-07-30 RX ORDER — OXYTOCIN/0.9 % SODIUM CHLORIDE 30/500 ML
60 PLASTIC BAG, INJECTION (ML) INTRAVENOUS ONCE AS NEEDED
Status: DISCONTINUED | OUTPATIENT
Start: 2024-07-30 | End: 2024-08-01 | Stop reason: HOSPADM

## 2024-07-30 RX ORDER — TRANEXAMIC ACID 100 MG/ML
1000 INJECTION, SOLUTION INTRAVENOUS ONCE AS NEEDED
Status: DISCONTINUED | OUTPATIENT
Start: 2024-07-30 | End: 2024-08-01 | Stop reason: HOSPADM

## 2024-07-30 RX ORDER — IBUPROFEN 600 MG/1
600 TABLET ORAL EVERY 6 HOURS
Status: DISCONTINUED | OUTPATIENT
Start: 2024-07-30 | End: 2024-08-01 | Stop reason: HOSPADM

## 2024-07-30 RX ORDER — LIDOCAINE 560 MG/1
1 PATCH PERCUTANEOUS; TOPICAL; TRANSDERMAL
Status: DISCONTINUED | OUTPATIENT
Start: 2024-07-30 | End: 2024-08-01 | Stop reason: HOSPADM

## 2024-07-30 RX ORDER — OXYTOCIN 10 [USP'U]/ML
10 INJECTION, SOLUTION INTRAMUSCULAR; INTRAVENOUS ONCE AS NEEDED
Status: DISCONTINUED | OUTPATIENT
Start: 2024-07-30 | End: 2024-08-01 | Stop reason: HOSPADM

## 2024-07-30 RX ORDER — ENOXAPARIN SODIUM 100 MG/ML
40 INJECTION SUBCUTANEOUS EVERY 24 HOURS
Status: DISCONTINUED | OUTPATIENT
Start: 2024-07-31 | End: 2024-08-01 | Stop reason: HOSPADM

## 2024-07-30 RX ORDER — BISACODYL 10 MG/1
10 SUPPOSITORY RECTAL DAILY PRN
Status: DISCONTINUED | OUTPATIENT
Start: 2024-07-30 | End: 2024-08-01 | Stop reason: HOSPADM

## 2024-07-30 RX ORDER — SIMETHICONE 80 MG
80 TABLET,CHEWABLE ORAL 4 TIMES DAILY PRN
Status: DISCONTINUED | OUTPATIENT
Start: 2024-07-30 | End: 2024-08-01 | Stop reason: HOSPADM

## 2024-07-30 RX ORDER — POLYETHYLENE GLYCOL 3350 17 G/17G
17 POWDER, FOR SOLUTION ORAL 2 TIMES DAILY PRN
Status: DISCONTINUED | OUTPATIENT
Start: 2024-07-30 | End: 2024-08-01 | Stop reason: HOSPADM

## 2024-07-30 RX ORDER — ACETAMINOPHEN 325 MG/1
975 TABLET ORAL EVERY 6 HOURS
Status: DISCONTINUED | OUTPATIENT
Start: 2024-07-30 | End: 2024-08-01 | Stop reason: HOSPADM

## 2024-07-30 RX ORDER — CARBOPROST TROMETHAMINE 250 UG/ML
250 INJECTION, SOLUTION INTRAMUSCULAR ONCE AS NEEDED
Status: DISCONTINUED | OUTPATIENT
Start: 2024-07-30 | End: 2024-08-01 | Stop reason: HOSPADM

## 2024-07-30 RX ORDER — CAFFEINE 200 MG
200 TABLET ORAL ONCE
Status: DISCONTINUED | OUTPATIENT
Start: 2024-07-30 | End: 2024-07-30

## 2024-07-30 RX ORDER — DIPHENHYDRAMINE HYDROCHLORIDE 50 MG/ML
25 INJECTION INTRAMUSCULAR; INTRAVENOUS EVERY 6 HOURS PRN
Status: DISCONTINUED | OUTPATIENT
Start: 2024-07-30 | End: 2024-07-30

## 2024-07-30 RX ORDER — LOPERAMIDE HYDROCHLORIDE 2 MG/1
4 CAPSULE ORAL EVERY 2 HOUR PRN
Status: DISCONTINUED | OUTPATIENT
Start: 2024-07-30 | End: 2024-08-01 | Stop reason: HOSPADM

## 2024-07-30 RX ORDER — METHYLERGONOVINE MALEATE 0.2 MG/ML
0.2 INJECTION INTRAVENOUS ONCE AS NEEDED
Status: DISCONTINUED | OUTPATIENT
Start: 2024-07-30 | End: 2024-08-01 | Stop reason: HOSPADM

## 2024-07-30 RX ORDER — MISOPROSTOL 200 UG/1
800 TABLET ORAL ONCE AS NEEDED
Status: DISCONTINUED | OUTPATIENT
Start: 2024-07-30 | End: 2024-08-01 | Stop reason: HOSPADM

## 2024-07-30 ASSESSMENT — PAIN SCALES - GENERAL
PAINLEVEL_OUTOF10: 5 - MODERATE PAIN
PAINLEVEL_OUTOF10: 3
PAINLEVEL_OUTOF10: 0 - NO PAIN
PAINLEVEL_OUTOF10: 2
PAINLEVEL_OUTOF10: 0 - NO PAIN
PAINLEVEL_OUTOF10: 0 - NO PAIN
PAINLEVEL_OUTOF10: 1
PAINLEVEL_OUTOF10: 0 - NO PAIN

## 2024-07-30 NOTE — PROGRESS NOTES
Intrapartum Progress Note    Assessment/Plan   Aron Salinas is a 28 y.o.  at 39w1d, JENNIFER: 2024, by Last Menstrual Period admitted for scheduled IOL    IOL  Method: Pit, s/p CRB and AROM  AROM at 0245am 2024  Pain management: Epidural infusing  CEFM, currently Category I  Terbutaline given prior to start induction yesterday  at around 1805 for a prolonged deccel (10 mins, saurabh in 50s), FHT subsequently recovered  GBS: negative  Next exam: as clinically indicated     Maternal conditions  Hx uterine fibroids  Per chart review, has white coat syndrome (elevated BP in clinic visits without elevated BP at home or concern for PEC; HELLP labs neg x2 outpatient)    Seen and discussed with Dr. Cindy Shields MD  OBGYN    Subjective   Aron is feeling good at this time, comfortable with epidural running. With , Raman, and mother at bedside.  Feeling pressure intermittently.    Pp Liletta desired, in room    Objective   Last Vitals:  Temp Pulse Resp BP MAP Pulse Ox   37.1 °C (98.8 °F) 99 20 124/74   100 %     Vitals Min/Max Last 24 Hours:  Temp  Min: 36.6 °C (97.9 °F)  Max: 37.1 °C (98.8 °F)  Pulse  Min: 82  Max: 132  Resp  Min: 18  Max: 20  BP  Min: 112/58  Max: 139/77    Intake/Output:    Intake/Output Summary (Last 24 hours) at 2024 0742  Last data filed at 2024 0608  Gross per 24 hour   Intake 160 ml   Output 1950 ml   Net -1790 ml       Physical Examination:  General: no acute distress  HEENT: normocephalic, atraumatic  Heart: warm and well perfused  Lungs: breathing comfortably on room air  Abdomen: gravid  Extremities: moving all extremities  Neuro: awake and conversant  Psych: appropriate mood and affect    SVE: /0  FHT: 120/mod/+accel/-decel  [place]: q3min    Lab Review:  Labs in chart have been reviewed    Results from last 7 days   Lab Units 24  1840   HEMOGLOBIN g/dL 12.9   HEMATOCRIT % 37.7   PLATELETS AUTO x10*3/uL 292

## 2024-07-30 NOTE — SIGNIFICANT EVENT
"Labor Progress Note    Pt resting comfortably in bed.    Vitals: /79   Pulse 99   Temp 37 °C (98.6 °F)   Resp 20   Ht 1.676 m (5' 6\")   Wt 94.7 kg (208 lb 12.4 oz)   LMP 10/30/2023   SpO2 100%   BMI 33.70 kg/m²     SVE: fingertip/0/-3; CRB in place  FHT: 140/mod/+accel/-decel  [place]: CTX q2 min    A/P:  Continue to monitor for cervical change  CRB placed without concern  To start pit, will continue to monitor  CEFM, currently Category I  Epidural infusing    Silvina Tellez MD PGY-1   OB/GYN    "

## 2024-07-30 NOTE — L&D DELIVERY NOTE
OB Delivery Note  2024  Aron Salinas  28 y.o.   Vaginal, Spontaneous     Gestational Age: 39w1d  /Para:   Quantitative Blood Loss: Admission to Discharge: 175 mL (2024  5:02 PM - 2024  5:29 PM)    Ashley Salinas [[id number]]      Labor Events    Rupture date/time: 2024 0251  Rupture type: Artificial  Fluid color: Clear  Fluid odor: None  Labor type: Induced Onset of Labor  Labor allowed to proceed with plans for an attempted vaginal birth?: Yes  Induction: AROM, Oxytocin  First cervical ripening date/time: 2024  Induction date/time: 2024 1700  Induction indications: Risk Reducing  Complications: None       Labor Event Times    Labor onset date/time: 2024  Dilation complete date/time: 2024 1242  Start pushing date/time: 2024 1250       Labor Length    1st stage: 15h 24m  2nd stage: 0h 38m  3rd stage: 0h 07m       Placenta    Placenta delivery date/time: 2024 1327  Placenta removal: Spontaneous  Placenta appearance: Intact  Placenta disposition: discarded       Cord    Complications: Nuchal  Nuchal intervention: reduced  Nuchal cord description: tight nuchal cord  Number of loops: 1  Delayed cord clamping?: Yes  Cord blood disposition: Lab  Gases sent?: No  Stem cell collection (by provider): No       Lacerations    Episiotomy: None  Perineal laceration: 2nd  Perineal laceration repaired?: Yes  Repair suture: 2-0 Synthetic Suture, 3-0 Monocryl       Anesthesia    Method: Epidural       Operative Delivery    Forceps attempted?: No  Vacuum extractor attempted?: No       Shoulder Dystocia    Shoulder dystocia present?: No        Delivery    Birth date/time: 2024 13:20:00  Delivery type: Vaginal, Spontaneous  Complications: None       Resuscitation    Method: Suctioning, Tactile stimulation       Apgars    Living status: Living  Apgar Component Scores:  1 min.:  5 min.:  10 min.:  15 min.:  20 min.:    Skin color:  0  1       Heart  rate:  2  2       Reflex irritability:  2  2       Muscle tone:  1  2       Respiratory effort:  2  2       Total:  7  9       Apgars assigned by: FABIOLA MELTON       Delivery Providers    Delivering clinician: Lucero Ba MD   Provider Role    Christa Naylor, RN Delivery Nurse    Ya Dan, RN Nursery Nurse    Shaneka Shields MD Resident    Herbert Corbett MD Resident    Lucero Ba MD Obstetrician/Gynecologist                 Intrauterine Device Inserted: Yes, after vaginal delivery   Intrauterine Device Inserted: IUD Device Type: Liletta  Ultrasound Guidance: Yes    IUD after Vaginal Delivery Consent and Procedure Statement:     Consent: The risks, benefits, and alternatives to immediate postplacental intrauterine device insertion were discussed with the patient.  Specifically, we discussed the possible risks of bleeding, infection, perforation, failure, increased risk of ectopic should pregnancy occur, and the higher risk of expulsion.  Written consent was obtained prior to the procedure and is detailed in the patient’s record.      Procedure: The intrauterine device was placed at the fundus using standard technique.    Shaneka Shields MD

## 2024-07-30 NOTE — SIGNIFICANT EVENT
"Labor Progress Note    Subjective: Aron is feeling good. Reports feeling more and more pressure, now between and with contractions. Epidural infusing, not in pain.    Objective:  Vitals: /76   Pulse 96   Temp 36.9 °C (98.4 °F)   Resp 17   Ht 1.676 m (5' 6\")   Wt 94.7 kg (208 lb 12.4 oz)   LMP 10/30/2023   SpO2 98%   BMI 33.70 kg/m²     SVE: 8/100/0. Anterior and left cervical lip remaining.  FHT: 125/moderate/+accel/-deccel  [place]: irritable    A/P:  Continue pitocin per protocol  CEFM, currently Category I  Epidural infusing  Next check with increased, constant pressure    Shaneka Shields MD, MPH  Obstetrics & Gynecology, PGY-1      "

## 2024-07-30 NOTE — SIGNIFICANT EVENT
"Labor Progress Note    Pt resting comfortably in bed. Ctx starting to get more intense.    Vitals: /64   Pulse 89   Temp 36.6 °C (97.9 °F)   Resp 20   Ht 1.676 m (5' 6\")   Wt 94.7 kg (208 lb 12.4 oz)   LMP 10/30/2023   SpO2 100%   BMI 33.70 kg/m²     SVE: 5/70/-2  FHT: 125/minimal/+accel/+ recurrent early decels  [place]: CTX q2-3 min    A/P:  Continue to monitor for cervical change  Continue pitocin per protocol, currently at 12  CEFM, currently Category II; reassured by accels and previous consistent moderate variability   Epidural infusing    Silvina Tellez MD PGY-1   OB/GYN    "

## 2024-07-30 NOTE — DISCHARGE INSTRUCTIONS

## 2024-07-30 NOTE — SIGNIFICANT EVENT
"Labor Progress Note    Pt resting comfortably in bed.    Vitals: /61   Pulse 86   Temp 36.8 °C (98.2 °F)   Resp 20   Ht 1.676 m (5' 6\")   Wt 94.7 kg (208 lb 12.4 oz)   LMP 10/30/2023   SpO2 100%   BMI 33.70 kg/m²     SVE: 4/30/-3; AROM clear fluid  FHT: 125/mod/+accel/-decel  [place]: CTX q1-2 min    A/P:  AROM for clear fluid  Continue to monitor for cervical change  Continue pitocin per protocol, currently at 10  CEFM, currently Category I  Epidural infusing    Silvina Tellez MD PGY-1   OB/GYN  "

## 2024-07-31 PROCEDURE — 2500000004 HC RX 250 GENERAL PHARMACY W/ HCPCS (ALT 636 FOR OP/ED)

## 2024-07-31 PROCEDURE — 2500000001 HC RX 250 WO HCPCS SELF ADMINISTERED DRUGS (ALT 637 FOR MEDICARE OP)

## 2024-07-31 PROCEDURE — 1100000001 HC PRIVATE ROOM DAILY

## 2024-07-31 ASSESSMENT — PAIN DESCRIPTION - LOCATION: LOCATION: PERINEUM

## 2024-07-31 ASSESSMENT — PAIN SCALES - GENERAL
PAINLEVEL_OUTOF10: 3
PAINLEVEL_OUTOF10: 0 - NO PAIN
PAINLEVEL_OUTOF10: 2
PAINLEVEL_OUTOF10: 0 - NO PAIN
PAINLEVEL_OUTOF10: 2
PAINLEVEL_OUTOF10: 1

## 2024-07-31 NOTE — ANESTHESIA POSTPROCEDURE EVALUATION
Patient: Aron Salinas is a 28 y.o., , who had a Vaginal, Spontaneous delivery on 2024 at 39w1d and is now POD1.    She had Neuraxial Anesthesia without immediate complications noted.       Pain well controlled    Vitals:    24 0756   BP: 110/75   Pulse: 81   Resp: 16   Temp: 36.9 °C (98.4 °F)   SpO2: 98%       Neuraxial site assessed. No visible redness or swelling or drainage. Patient able to ambulate and move all extremities without difficulty. Able to void. No complaints of nausea/vomiting. Tolerating PO intake well. No s/sx of PDPH.     Anesthesia will sign off     Suleiman Agustin, CAA

## 2024-07-31 NOTE — PROGRESS NOTES
Postpartum Progress Note    Assessment/Plan   Aron Salinas is a 28 y.o., , who delivered at 39w1d gestation    Now PPD#1 s/p Vaginal, Spontaneous on 2024  - Continue routine postpartum care  - Pain well controlled on po medications  - DVT risk score DVT Score: 5 , ppx with Lovenox  - Rh positive, no indication for Rhogam  -  cc    - Hgb:   Results from last 7 days   Lab Units 24  1840   HEMOGLOBIN g/dL 12.9        Maternal Well-Being  - Vitals stable  - All questions and concerns address    [place] Feeding  - Breastfeeding/pumping encouraged  - Lactation consult prn    Contraception  - pp Liletta placed    Dispo  - Anticipate d/c on PPD #2 if meeting all postpartum milestones  - Follow up in 4-6 wk for postpartum visit with your OB provider.     Marina Beck, APRN-CNP     Principal Problem:    39 weeks gestation of pregnancy ([facility])    Pregnancy Problems (from 23 to present)       Problem Noted Resolved    39 weeks gestation of pregnancy ([facility]) 2024 by Rimma Tellez MD No    Priority:  Medium      Encounter for supervision of normal first pregnancy, second trimester ([facility]) 3/20/2024 by Corrine Mcmahon MD 2024 by Corrine Mcmahon MD    First pregnancy, first trimester ([facility]) 2024 by Corrine Mcmahon MD 2024 by Corrine Mcmahon MD          Hospital course: no complications    Subjective      Aron Salinas is PPD#1 s/p vaginal delivery who reports feeling overall well.  No acute events overnight.  Voiding spontaneously, passing flatus.  Pain well controlled on PO meds.  Light lochia. Tolerating diet.  Denies HA, N/V, RUQ pain, vision changes, chest pain, or SOB. Breastfeeding is progressing well.     Objective   Allergies:   Penicillins and Sulfa (sulfonamide antibiotics)         Last Vitals:  Temp Pulse Resp BP MAP Pulse Ox   36.9 °C (98.4 °F) 81 16 110/75   98 %     Vitals Min/Max Last 24 Hours:  Temp  Min: 36.2 °C (97.2 °F)  Max: 36.9 °C (98.4 °F)  Pulse  Min:  80  Max: 116  Resp  Min: 16  Max: 18  BP  Min: 97/63  Max: 134/70    Intake/Output:     Intake/Output Summary (Last 24 hours) at 7/31/2024 0912  Last data filed at 7/30/2024 1610  Gross per 24 hour   Intake --   Output 1175 ml   Net -1175 ml       Physical Exam:  General: examination reveals a well developed, well nourished, female, in no acute distress. She is alert and cooperative.  HEENT: external ears normal. Nose normal, no erythema or discharge.  Neck: supple, no significant adenopathy  Lungs: breathing even and unlabored, lungs clear all fields  Cardiac: warm and well perfused, heart rate regular rate and rhythm, no murmur  Fundus: firm and below umbilicus, lochia light  Abdominal: soft, non tender, non-distended, bowel sounds active, + flatus  Extremities: no redness or tenderness in the calves or thighs, +1 pitting edema BLE.  Neurological: alert, oriented, normal speech, no focal findings or movement disorder noted.  Psychological: awake and alert; oriented to person, place, and time.  Skin: perineum well approximated, negative erythema/discharge/edema      Lab Data:  Lab Results   Component Value Date    ABO A 07/29/2024    LABRH POS 07/29/2024    ABSCRN NEG 07/29/2024     Lab Results   Component Value Date    WBC 14.7 (H) 07/29/2024    HGB 12.9 07/29/2024    HCT 37.7 07/29/2024     07/29/2024

## 2024-07-31 NOTE — LACTATION NOTE
Lactation Consultant Note  Lactation Consultation  Reason for Consult: Follow-up assessment, Other (Comment), Breast/nipple pain (patient called out for assistance with feeding)  Consultant Name: Laura Marcum RN, IBCLC    Maternal Information       Maternal Assessment  Breast Assessment: Medium, Symmetrical, Compressible, Other (Comment) (expressible)  Nipple Assessment: Intact, Erect, Red/inflamed, Sore, Other (Comment) (tip of the right nipple and base of the right nipple reddened from previous shallow latchig . left nipple slightly reddened)  [place] Assessment: Normal    Infant Assessment  Infant Behavior: Sleepy, Readiness to feed, Feeding cues observed, Suckles on and off, needs stimulation, Content after feeding  Infant Assessment: Palate - high/arch/bubble/normal, Receding chin, Sublingual frenulum (comment), Other (Comment), Good cupping of tongue (baby losing suction on finger and at breast)    Feeding Assessment  Nutrition Source: Breastmilk  Feeding Method: Nursing at the breast  Feeding Position: Cross - cradle, Cradle, C - hold, Skin to skin, One side per feeding, Nipple to nose, Mother needs assistance with latch/positioning  Suck/Feeding: Sustained, Coordinated suck/swallow/breathe, Tactile stimulation needed, Audible swallowing with stimulaton  Latch Assessment: Moderate assistance is needed, Instructed on deep latch, [place] attachment, Deep latch obtained, Optimal angle of mouth opening, Comfortable with no pain, Upper lip turned in, Comfortable latch, Other (Comment) (needed to adjust to a deeper latch a few times during the feeding)    LATCH TOOL  Latch: Grasps breast, tongue down, lips flanged, rhythmic sucking  Audible Swallowing: Spontaneous and intermittent (24 hours old)  Type of Nipple: Everted (After stimulation)  Comfort (Breast/Nipple): Filling, red/small blisters/bruises, mild/moderate discomfort  Hold (Positioning): Minimal assist, teach one side, mother does other, staff  holds  LATCH Score: 8    Breast Pump       Other OB Lactation Tools       Patient Follow-up  Inpatient Lactation Follow-up Needed : Yes  Outpatient Lactation Follow-up: Recommended    Other OB Lactation Documentation  Maternal Risk Factors: Other (comment) (tight frenulum)  Infant Risk Factors: Early term birth 37-39 weeks, Other (comment) (tight frenulum, receding chin)    Recommendations/Summary  Called to room to assist with feeding.   Mom stated she is having difficulty in latching the baby to the left breast. She has been able to latch the baby to the right breast but, has some discomfort at times with the latch. She has been using the cross cradle hold.   Noted the right nipple tip and base is reddened and mom stated sore (from previous shallow latching or slipping).   Reviewed the importance of a deep latch for her comfort and for proper milk transfer.     Offered to assist with latching the baby and mom was receptive.   Mom stated she has been using the cross cradle hold.   Reviewed positioning of baby (in cross cradle hold),  use of pillow support, hand placement on baby and on breast, expression of colostrum to the nipple prior to latching (mom is very expressible),  latching technique, and use of breast compression and stimulation to keep the baby feeding at the breast longer.    Deep latch obtained on the left breast and mom/ Dad were very happy. Mom stated the initial latch on was tender but, then was comfortable after a few sucks.   Noted swallows and encouraged mom to keep stimulating the baby to keep feeding at the breast.   A few times the baby lost suction of the deep latch and slipped shallow. This could be the cause of the other nipple being reddened and sore and probably the tight frenulum plays a factor in this. Encouraged mom to pay attention and readjust to a deeper latch, if needed. (Showed her technique on how to do this.).     Baby came of the breast and was content.     Reviewed nipple  care.     Encouraged mom to breastfeed on demand with a goal of 8-12 feeds in a 24 hour period.   If baby is not showing feeding cues and it has been 3 hours since the last time the baby was fed or the last time the baby attempted to feed, encouraged her to place baby in skin to skin and attempt to feed.     Encouraged her to call for assistance with feeds, if needed.     Questions answered.

## 2024-07-31 NOTE — LACTATION NOTE
Lactation Consultant Note  Lactation Consultation  Reason for Consult: Initial assessment  Consultant Name: SOPHIE Turk RN IBCLC    Maternal Information  Has mother  before?: No  Infant to breast within first 2 hours of birth?: No  Breastfeeding Delayed Due to: Other (Comment) (an attempt was made)  Exclusive Pump and Bottle Feed: No    Maternal Assessment  Breast Assessment: Medium, Symmetrical, Soft, Compressible  Nipple Assessment: Erect, Rounded after feeding  Areola Assessment: Normal    Infant Assessment  Infant Behavior: Quiet alert, Readiness to feed, Feeding cues observed, Rooting response, Sucking  Infant Assessment: Palate - high/arch/bubble/normal, Tongue protrudes over alveolar ridge, Good cupping of tongue, Other (Comment) (prominent frenulum noted)    Feeding Assessment  Nutrition Source: Breastmilk  Feeding Method: Nursing at the breast  Feeding Position: Cross - cradle, Skin to skin, One side per feeding, Nipple to nose, Mother needs assistance with latch/positioning, Nose lightly touching breast, Chin tucked into breast  Suck/Feeding: Sustained, Coordinated suck/swallow/breathe, Baby led rhythmically, Content after feeding  Latch Assessment: Moderate assistance is needed, Instructed on deep latch, Eagerly grasped on to latch, Areolar attachment, Optimal angle of mouth opening, Comfortable with no pain, Sucking and swallowing, Chin and lower lip contact breast first, Bursts of sucking, swallowing, and rest, Flanged lips, Chin moves in rhythmic motion, Comfortable latch    LATCH TOOL  Latch: Grasps breast, tongue down, lips flanged, rhythmic sucking  Audible Swallowing: A few with stimulation  Type of Nipple: Everted (After stimulation)  Comfort (Breast/Nipple): Soft/non-tender  Hold (Positioning): Minimal assist, teach one side, mother does other, staff holds  LATCH Score: 8    Breast Pump       Other OB Lactation Tools       Patient Follow-up  Inpatient Lactation Follow-up Needed :  Yes    Other OB Lactation Documentation  Infant Risk Factors: Early term birth 37-39 weeks    Recommendations/Summary    I was asked by the RN to see this first-time breastfeeding mother who has made several attempts to latch her infant since delivery. Prior to my arrival to the room, the infant began demonstrating feeding cues. The mother was attempting to breastfeed. The infant was already skin to skin with his mother. I suggested a cross-cradle hold for latching and provided pillows for support. We reviewed correct infant body alignment, proper head/breast alignment, positioning the infant's nose opposite the maternal nipple, and bringing the infant to the breast with a wide, open mouth. He latched readily with well-flanged lips, rhythmic sucking and frequent swallowing. The mother reported the latch as comfortable. The mother was shown how to use breast massage to keep the infant actively sucking at the breast. He remained on the right breast for about 25 minutes before spontaneously releasing it. After attempting to burp, the mother attempted on the opposite breast, however the infant was not interested. She is encouraged to attempt to feed by 3 hours after this feed, or earlier, if the infant demonstrates feeding cues. We reviewed the following breastfeeding topics: early  feeding patterns and behaviors, especially in the first 24 hours of life; frequent feeds with goal of 8-12 feeds/24 hours; the benefits of skin to skin for breastfeeding; the importance of a deep latch for maternal comfort and optimal milk production/transfer; alternating starting breast and allowing the infant to end the feeding; and the rationale for delaying pumping (unless clinically indicated) and pacifier use until breastfeeding is well-established. All questions were answered and the parents are encouraged to call for assistance as needed. The mother was given written information on outpatient lactation services. She has a  breast pump for home use.

## 2024-07-31 NOTE — CARE PLAN
The patient's goals for the shift include work on breastfeeding    The clinical goals for the shift include work on breast feeding

## 2024-08-01 VITALS
WEIGHT: 208.78 LBS | HEART RATE: 70 BPM | HEIGHT: 66 IN | OXYGEN SATURATION: 96 % | BODY MASS INDEX: 33.55 KG/M2 | TEMPERATURE: 97.2 F | DIASTOLIC BLOOD PRESSURE: 80 MMHG | RESPIRATION RATE: 17 BRPM | SYSTOLIC BLOOD PRESSURE: 133 MMHG

## 2024-08-01 PROCEDURE — 2500000001 HC RX 250 WO HCPCS SELF ADMINISTERED DRUGS (ALT 637 FOR MEDICARE OP)

## 2024-08-01 PROCEDURE — 2500000004 HC RX 250 GENERAL PHARMACY W/ HCPCS (ALT 636 FOR OP/ED)

## 2024-08-01 RX ORDER — ACETAMINOPHEN 325 MG/1
975 TABLET ORAL EVERY 6 HOURS PRN
Qty: 120 TABLET | Refills: 0 | Status: CANCELLED | OUTPATIENT
Start: 2024-08-01

## 2024-08-01 RX ORDER — POLYETHYLENE GLYCOL 3350 17 G/17G
17 POWDER, FOR SOLUTION ORAL 2 TIMES DAILY PRN
Qty: 14 PACKET | Refills: 0 | Status: CANCELLED | OUTPATIENT
Start: 2024-08-01

## 2024-08-01 RX ORDER — IBUPROFEN 600 MG/1
600 TABLET ORAL EVERY 6 HOURS PRN
Qty: 60 TABLET | Refills: 0 | Status: CANCELLED | OUTPATIENT
Start: 2024-08-01

## 2024-08-01 ASSESSMENT — PAIN SCALES - GENERAL
PAINLEVEL_OUTOF10: 2
PAINLEVEL_OUTOF10: 2
PAINLEVEL_OUTOF10: 3

## 2024-08-01 ASSESSMENT — PAIN DESCRIPTION - DESCRIPTORS
DESCRIPTORS: DISCOMFORT;SORE
DESCRIPTORS: DISCOMFORT;SORE

## 2024-08-01 ASSESSMENT — PAIN DESCRIPTION - LOCATION: LOCATION: PERINEUM

## 2024-08-01 NOTE — CARE PLAN
Problem: Postpartum  Goal: Experiences normal postpartum course  Outcome: Progressing  Goal: Appropriate maternal -  bonding  Outcome: Progressing  Goal: Establish and maintain infant feeding pattern for adequate nutrition  Outcome: Progressing  Goal: Incisions, wounds, or drain sites healing without S/S of infection  Outcome: Progressing  Goal: No s/sx infection  Outcome: Progressing  Goal: No s/sx of hemorrhage  Outcome: Progressing  Goal: Minimal s/sx of HDP and BP<160/110  Outcome: Progressing     Problem: Pain - Adult  Goal: Verbalizes/displays adequate comfort level or baseline comfort level  Outcome: Progressing     Problem: Safety - Adult  Goal: Free from fall injury  Outcome: Progressing     Problem: Discharge Planning  Goal: Discharge to home or other facility with appropriate resources  Outcome: Progressing   The patient's goals for the shift include work on breastfeeding    The clinical goals for the shift include breast feeding

## 2024-08-01 NOTE — CARE PLAN
The patient's goals for the shift include   Problem: Postpartum  Goal: Experiences normal postpartum course  Outcome: Met  Goal: Appropriate maternal -  bonding  Outcome: Met     Problem: Safety - Adult  Goal: Free from fall injury  Outcome: Met     Problem: Discharge Planning  Goal: Discharge to home or other facility with appropriate resources  Outcome: Met           VSS, bleeding and swelling minimal, pain controlled with medications, IV removed, breastfeeding.

## 2024-08-01 NOTE — DISCHARGE SUMMARY
"Discharge Summary    Aron Salinas is a 28 y.o. year old female , who delivered at 39w1d gestation via , c/b 2nd degree lacerations s/p repair.  Admission Date: 2024  Discharge Date: 24       Discharge Diagnosis  Spontaneous vaginal delivery    Hospital Course  Delivery Date: 2024 1:20 PM  Delivery type: Vaginal, Spontaneous   GA at delivery: 39w1d   Outcome: Living  Intrapartum complications: None  Feeding method: Breastfeeding Status: Yes     Procedures: IUD insertion  Contraception at discharge: Liletta IUD     Pertinent Physical Exam At Time of Discharge    General: Examination reveals a well developed, well nourished, female, in no acute distress. She is alert and cooperative.  Lungs: symmetrical, non-labored breathing.  Cardiac: warm, well-perfused.  Abdomen: soft, non-tender.  Fundus: firm, below umbilicus, and nontender.  Extremities: no redness or tenderness in the calves or thighs.  Neurological: alert, oriented, normal speech, no focal findings or movement disorder noted.     Vitals  /80   Pulse 70   Temp 36.2 °C (97.2 °F) (Temporal)   Resp 17   Ht 1.676 m (5' 6\")   Wt 94.7 kg (208 lb 12.4 oz)   LMP 10/30/2023   SpO2 96%   Breastfeeding Yes   BMI 33.70 kg/m²      Discharge Meds     Your medication list        CONTINUE taking these medications        Instructions Last Dose Given Next Dose Due   blood pressure monitor kit  Commonly known as: Blood Pressure Kit      1 kit 2 times a day. Log and bring to next OB apptmt If all <130/80, ok to stop after 1 week of checking       prenatal vitamin (iron-folic) 27 mg iron-800 mcg folic acid tablet      Take 1 tablet by mouth once daily.              STOP taking these medications      aspirin 81 mg chewable tablet        PNV 3-IRON FUM,GLUC-FOLIC ACID ORAL                  Complications Requiring Follow-Up  None    Test Results Pending At Discharge  Pending Labs       No current pending labs.            Outpatient " Follow-Up  Follow up with your primary OB in 4-6 weeks for postpartum visit    I spent 20 minutes in the professional and overall care of this patient.      Peyton Dacosta, APRN-CNP

## 2024-08-01 NOTE — LACTATION NOTE
Lactation Consultant Note  Lactation Consultation  Reason for Consult: Follow-up assessment  Consultant Name: Luisa Moya RN, IBCLC    Maternal Information       Maternal Assessment  Breast Assessment: Medium, Symmetrical, Soft, Filling  Nipple Assessment: Intact, Erect, Red/inflamed (tender, but comfortable latch)  Alterations in Nipple Condition: Stage I - pain or irritation with no skin break down  Areola Assessment: Normal    Infant Assessment  Infant Behavior: Quiet alert, Rooting response, Readiness to feed  Infant Assessment: Good cupping of tongue, Able to elevate tongue to roof of mouth    Feeding Assessment  Nutrition Source: Breastmilk  Feeding Method: Nursing at the breast  Feeding Position: Football/seated, Skin to skin, Breast sandwich, Mother needs assistance with latch/positioning, Nose lightly touching breast, Chin tucked into breast, Nipple to nose  Suck/Feeding: Sustained, Audible swallowing  Latch Assessment: Instructed on deep latch, Eagerly grasped on to latch, Areolar attachment, Wide open mouth < 160, Comfortable with no pain, Bursts of sucking, swallowing, and rest, Flanged lips, Chin moves in rhythmic motion    LATCH TOOL  Latch: Grasps breast, tongue down, lips flanged, rhythmic sucking  Audible Swallowing: Spontaneous and intermittent (24 hours old)  Type of Nipple: Everted (After stimulation)  Comfort (Breast/Nipple): Soft/non-tender  Hold (Positioning): Minimal assist, teach one side, mother does other, staff holds  LATCH Score: 9           Recommendations/Summary  Worked with mom to interpret baby's cues.  Mom reported that baby fed well overnight, but had trouble staying latched deeply as the night went on. On assessment baby has a mildly tight frenulum, but baby is able to elevate tongue to roof of mouth and tongue extends well over lower gums. Baby was arching his back when roused from sleep and let out a few wet burps (clear fluid).   When we initially put baby to breast this  morning, baby was reluctant and unable to sustain a latch. Overall baby appeared to be somewhat gassy and was placed skin to skin with mom and settled with her there for about an hour.     I was then called back to room to assist with positioning and latch. Mom was mostly independent, just a slight assist to make sure baby's belly was in towards mom and mom's nipple was lined up with baby's nose. Mom was able to sandwich her areola and tap her nipple to baby's top lip, waiting for baby to open mouth wide. Then, with a slight assist, was able to pull baby in for a deep, comfortable latch. Baby was able to sustain a good sucking pattern, just needing some tactile stimulation to encourage him to continue. Also showed mom how to compress/massage her breast toward baby as another way to encourage baby to keep sucking/swallowing.      Mom appeared to be more comfortable and confident, and is ready for discharge today. Encouraged her to follow up with outpatient lactation as needed.  Mom has all needed resource materials for home.

## 2024-08-05 ENCOUNTER — APPOINTMENT (OUTPATIENT)
Dept: OBSTETRICS AND GYNECOLOGY | Facility: CLINIC | Age: 29
End: 2024-08-05
Payer: COMMERCIAL

## 2024-09-11 ENCOUNTER — LACTATION CONSULT (OUTPATIENT)
Dept: LACTATION | Facility: CLINIC | Age: 29
End: 2024-09-11
Payer: COMMERCIAL

## 2024-09-11 DIAGNOSIS — O92.70 LACTATION DISORDER (HHS-HCC): Primary | ICD-10-CM

## 2024-09-11 PROCEDURE — 99211 OFF/OP EST MAY X REQ PHY/QHP: CPT | Performed by: OBSTETRICS & GYNECOLOGY

## 2024-09-11 ASSESSMENT — ENCOUNTER SYMPTOMS
LOSS OF SENSATION IN FEET: 0
DEPRESSION: 0
OCCASIONAL FEELINGS OF UNSTEADINESS: 0

## 2024-09-11 NOTE — PATIENT INSTRUCTIONS
PATIENT DISCUSSION SUMMARY:  .  .  Mother and infant seen for concerns over frequent feeding , shorter feeding at breast and choking when taking a bottle.    Infant with ankyloglossia.    Mother has been feeding on demand.   Mother's milk in and milk easily expressible.      Infant weight gain good gaining 47.6 g per day since last pediatric visit.      Infant alert with moist mucous membranes.  Wet diaper in office.    Mother's nipples erect and breasts soft,. Infant able to sustain latch.  Infant with occasional click/slurp.  Deepest latch achieved is still shallow. Mother reports latch as comfortable and nipples rounded after feeding.    Suck assessment abnormal: reveals infant with tight maxillary frenulum, and curls upper lip, but lip able to be everted.  Infant with good cupping and extension of tongue,  and moderate lateralization at this exam.  Infant not noted to elevate tongue past mid mouth on this exam. Infant with frequent  retraction and biting on exam, but mother not feeling biting at breast. Palate high and intact to palpation.    Discussed to bottle feed with more upright positioning and margareth lips to help infant have wide latch when using bottle by pulling down on chin and everting upper lip.    Bottle feeding not observed, but mother reports more clicking on bottle and choking with very slow feeding with bottle.    Mother instructed on suck training exercises.    Pumping session observed with  [employer] S1    pump with 24     mm flanges.  Consider slightly smaller flanges for improved fit. Discussed increasing vacuum to comfort, use of massage and compression, and hand expression after pumping to improve breast emptying.  Mother shown technique for hand expression. Pumping out approximately   4.75 oz in 15   minutes.    Reviewed post birth warning signs and safe sleep    Plan:  Mother to do suck training exercises prior to every feed.  Feed at least 8-12 times daily, both breasts for as long as  infant is actively sucking and swallowing.  Use massage and compression to aid transfer.       If infant not sustaining latch with good sucks and audible swallows, mother to pump both breasts at same time for 15-20 minutes, using massage and compression, with hand expression after to fully drain breast. Feed expressed milk to infant until satisfied.    May pump as desired for bottles of expressed milk.     Due to tight frenulum and choking with feeding, infant needs to be evaluated by ENT or pediatric dentist for  posterior frenulum, and to evaluate maxillary frenulum. If choking continues, recommend OT evaluation.    Will f/u with lactation as needed and with pediatrician.    Denies questions.  LACTATION PROBLEMS AND STRATEGIES:  Problems latching baby to breast: Baby should latch to areola (dark area) not just the nipple.  Baby's lips should be flanged outward.  Bring the baby up to the level of your breast by putting a pillow under the baby.  Encourage a deeper latch with the asymetrical latch- lining baby's nose opposite mother's nipple.  Encourage nipple to stand up prior to feeing by pumping, nipple rolling or by brief application of ice.  Gently tickle your baby's lip with your nipple to encourage your baby to open his/her mouth wide.  Hold baby so that your breast is positioned deep in the baby's mouth.  Hold your baby close, tummy to tummy.  If baby does not latch to breast, express breast milk.  If the baby is not latched on well, break seal and gently try again.  Keep baby skin to skin and watch for feeding cues.  Massage breast to start milk-ejection reflex.  Place nipple and at least 1 inch of areola into baby's mouth.  Place your hand behind the baby's neck and shoulder.  Do not force baby's head into breast.  Put baby in the football hold or clutch hold, supporting his/her neck and head in sniffing position to open his/her throat.  Shape breast into oval shape (sandwich hold)  for deep latch.  Try  "different feeding positions (cradle, football, side etc.).  Use a breast feeding pillow to bring baby up to the level of the breast.  Use semi-reclined feeding position to allow baby to take an active role and trigger baby's inborn feeding behavior.  Use your hand to support your breast during feeding.  When your baby's mouth is wide open, quickly pull baby into your breast.  Your baby's chin should be pressed into your breast.  Pumping pointers: Air dry nipples, express milk and rub in or use an approved nipple cream after expressing., Apply heat to breasts before pumping., Choose a familiar comfortable place to express milk., If nipples are sore use less pressure and pump more frequently for shorter times., Listen to a tape of baby while pumping., Look at a photo of baby wile expressing., Massage breasts before and during pumping., Minimize distractions., Moisten flange before beginning to improve seal., Pick a good time of day to begin (early in a.m., during the baby's long sleep stretch, when skipping a feeding, etc.)., Relax for 5 minutes before pumping., Switch breasts when flow lessens., and Use pumping bra/band for \"hands free\" pumping.  PATIENT INSTRUCTION HANDOUTS GIVEN:   Tips for pumping with an electric breast pump and milk storage for your healthy baby (PI# 123)  Suck training (PI# 176)  How to keep your breast pump kit clean  Foods that promote good milk production  Breastfeeding: Tips to increase your milk supply (PI# 162)  Breastfeeding: Plugged milk ducts/mastitis (PI# 164)  Breast massage with milk expression by hand (PI# [id number])  LACTATION EDUCATION:  Correct Positioning, Correct Latch On, and Demo use of Pump       "

## 2024-09-11 NOTE — PROGRESS NOTES
Lactation Counseling Note    Subjective:    Aron Salinas is a 29 y.o. patient referred for lactation counseling. She is here today due to Breast pumping concerns/issues and Latch issues. She was referred by her  self .     OB HISTORY:   Healthcare Provider: [facility]  Prenatal Screening: Negative  Monitoring BP  Maternal Blood Type: A positive  /Para: : 1  Para: 1  Weeks Gestation: 39/1  Mode of Delivery: Normal vaginal route  Induction/Augmentation  Epidural/General Anesthesia  elective  Delivery Complications: Nucchal cord x1  Maternal Complications: None  Brandon Information: Baby's Name: Jaya Gatica  : 24  MRN: [id number]  Place of Birth: [facility] [place]  Healthcare Provider: Ashlyn  APGARS: 1 minute: 7  5 minutes: 9  Skin to skin contact: First hour  Infant's blood type: not tested  Birth parameters: AGA  Birth weight: 3170 gm  Birth length: 19.75 inches  Discharge weight: 2977 gm  Complications: ankyloglossia, latching ok, some latch pain  No other concerns    Objective:    BREASTFEEDING ASSESSMENT:   Breast Assessment: Medium, Symmetrical, Soft, and Compressible  Nipple Assessment/Stage: intact, erect, rounded after feeding, and red/inflamed non tender  Areola: Normal  Feeding Position: breast sandwich, cross - cradle, skin to skin, both sides, and mother demonstrates good positioning  Latch: minimal assistance is needed, instructed on deep latch, areolar attachment, shallow latch, mouth not open wide enough, comfortable with no pain, sucking and swallowing, sucks with long jaw movement, chin and lower lip contact breast first, bursts of sucking, swallowing, and rest, upper lip turned in, lower lip turned in, flanged lips, chin moves in rhythmic motion, and frequent audible swallows  Suck/Feeding: sustained, coordinated suck/swallow/breathe, clenching/biting, baby led rhythmically, and audible swallowing  Alternative Feeding Methods: nursing at the breast, feeding expressed breast milk, and paced  bottle  Feeding and Cleaning instructions reviewed for: Paced bottle  Infant Feeding Method: Breast milk only  Infant Behavior: awake, fussy, readiness to feed, feeding cues observed, rooting response, sucking, and content after feeding  Infant Information: Ankyloglossia  Palate- high/arch/bubble/normal  Poor occlusion of lips around areola  Tongue protrudes over alveolar ridge  Tongue humped/bunched/retracted/elevated  Good cupping of tongue  Fontanel: flat  Mucous Membranes: moist  No otther concerns  Breast Pain: Pain scale 0-10 (10 most pain): 0  Nipple Pain: Pain scale 0-10 (10 most pain): 0  Weight: Reported pediatrician visit weight: 3912 gm  Date of pediatrician weight: 24  Weight before feedin gm  Weight after feedin gm  Amount of breast milk transferred: 144 ml  Number of voids in the last 24 hours: 10-12  Number of stools in the last 24 hours: 4-6  No other concerns        PATIENT DISCUSSION SUMMARY:  .  .  Mother and infant seen for concerns over frequent feeding , shorter feeding at breast and choking when taking a bottle.    Infant with ankyloglossia.    Mother has been feeding on demand.   Mother's milk in and milk easily expressible.      Infant weight gain good gaining 47.6 g per day since last pediatric visit.      Infant alert with moist mucous membranes.  Wet diaper in office.    Mother's nipples erect and breasts soft,. Infant able to sustain latch.  Infant with occasional click/slurp.  Deepest latch achieved is still shallow. Mother reports latch as comfortable and nipples rounded after feeding.    Suck assessment abnormal: reveals infant with tight maxillary frenulum, and curls upper lip, but lip able to be everted.  Infant with good cupping and extension of tongue,  and moderate lateralization at this exam.  Infant not noted to elevate tongue past mid mouth on this exam. Infant with frequent  retraction and biting on exam, but mother not feeling biting at breast. Palate  high and intact to palpation.    Discussed to bottle feed with more upright positioning and margareth lips to help infant have wide latch when using bottle by pulling down on chin and everting upper lip.    Bottle feeding not observed, but mother reports more clicking on bottle and choking with very slow feeding with bottle.    Mother instructed on suck training exercises.    Pumping session observed with  mymxlog S1    pump with 24     mm flanges.  Consider slightly smaller flanges for improved fit. Discussed increasing vacuum to comfort, use of massage and compression, and hand expression after pumping to improve breast emptying.  Mother shown technique for hand expression. Pumping out approximately   4.75 oz in 15   minutes.    Reviewed post birth warning signs and safe sleep    Plan:  Mother to do suck training exercises prior to every feed.  Feed at least 8-12 times daily, both breasts for as long as infant is actively sucking and swallowing.  Use massage and compression to aid transfer.       If infant not sustaining latch with good sucks and audible swallows, mother to pump both breasts at same time for 15-20 minutes, using massage and compression, with hand expression after to fully drain breast. Feed expressed milk to infant until satisfied.    May pump as desired for bottles of expressed milk.     Due to tight frenulum and choking with feeding, infant needs to be evaluated by ENT or pediatric dentist for  posterior frenulum, and to evaluate maxillary frenulum. If choking continues, recommend OT evaluation.    Will f/u with lactation as needed and with pediatrician.    Denies questions.  LACTATION PROBLEMS AND STRATEGIES:  Problems latching baby to breast: Baby should latch to areola (dark area) not just the nipple.  Baby's lips should be flanged outward.  Bring the baby up to the level of your breast by putting a pillow under the baby.  Encourage a deeper latch with the asymetrical latch- lining baby's nose  opposite mother's nipple.  Encourage nipple to stand up prior to feeing by pumping, nipple rolling or by brief application of ice.  Gently tickle your baby's lip with your nipple to encourage your baby to open his/her mouth wide.  Hold baby so that your breast is positioned deep in the baby's mouth.  Hold your baby close, tummy to tummy.  If baby does not latch to breast, express breast milk.  If the baby is not latched on well, break seal and gently try again.  Keep baby skin to skin and watch for feeding cues.  Massage breast to start milk-ejection reflex.  Place nipple and at least 1 inch of areola into baby's mouth.  Place your hand behind the baby's neck and shoulder.  Do not force baby's head into breast.  Put baby in the football hold or clutch hold, supporting his/her neck and head in sniffing position to open his/her throat.  Shape breast into oval shape (sandwich hold)  for deep latch.  Try different feeding positions (cradle, football, side etc.).  Use a breast feeding pillow to bring baby up to the level of the breast.  Use semi-reclined feeding position to allow baby to take an active role and trigger baby's inborn feeding behavior.  Use your hand to support your breast during feeding.  When your baby's mouth is wide open, quickly pull baby into your breast.  Your baby's chin should be pressed into your breast.  Pumping pointers: Air dry nipples, express milk and rub in or use an approved nipple cream after expressing., Apply heat to breasts before pumping., Choose a familiar comfortable place to express milk., If nipples are sore use less pressure and pump more frequently for shorter times., Listen to a tape of baby while pumping., Look at a photo of baby wile expressing., Massage breasts before and during pumping., Minimize distractions., Moisten flange before beginning to improve seal., Pick a good time of day to begin (early in a.m., during the baby's long sleep stretch, when skipping a feeding,  "etc.)., Relax for 5 minutes before pumping., Switch breasts when flow lessens., and Use pumping bra/band for \"hands free\" pumping.  PATIENT INSTRUCTION HANDOUTS GIVEN:   Tips for pumping with an electric breast pump and milk storage for your healthy baby (PI# 123)  Suck training (PI# 176)  How to keep your breast pump kit clean  Foods that promote good milk production  Breastfeeding: Tips to increase your milk supply (PI# 162)  Breastfeeding: Plugged milk ducts/mastitis (PI# 164)  Breast massage with milk expression by hand (PI# [id number])  LACTATION EDUCATION:  Correct Positioning, Correct Latch On, and Demo use of Pump       "

## 2024-09-12 ENCOUNTER — TELEPHONE (OUTPATIENT)
Dept: LACTATION | Facility: CLINIC | Age: 29
End: 2024-09-12
Payer: COMMERCIAL

## 2024-09-12 NOTE — LACTATION NOTE
Follow up phone call for lactation visit. Left message for patient to call IBCLC at [phone] to discuss progress or concerns.

## 2024-09-13 ENCOUNTER — TELEPHONE (OUTPATIENT)
Dept: LACTATION | Facility: CLINIC | Age: 29
End: 2024-09-13
Payer: COMMERCIAL

## 2024-09-13 NOTE — LACTATION NOTE
Follow up phone call for lactation visit. Left message for patient to call IBCLC at [phone] to discuss progress or concerns.

## 2024-09-16 ENCOUNTER — POSTPARTUM VISIT (OUTPATIENT)
Dept: OBSTETRICS AND GYNECOLOGY | Facility: CLINIC | Age: 29
End: 2024-09-16
Payer: COMMERCIAL

## 2024-09-16 VITALS — SYSTOLIC BLOOD PRESSURE: 115 MMHG | BODY MASS INDEX: 28.57 KG/M2 | DIASTOLIC BLOOD PRESSURE: 80 MMHG | WEIGHT: 177 LBS

## 2024-09-16 DIAGNOSIS — M62.08 DIASTASIS RECTI: ICD-10-CM

## 2024-09-16 PROBLEM — Z3A.39 39 WEEKS GESTATION OF PREGNANCY (HHS-HCC): Status: RESOLVED | Noted: 2024-07-29 | Resolved: 2024-09-16

## 2024-09-16 PROCEDURE — 0503F POSTPARTUM CARE VISIT: CPT | Performed by: ADVANCED PRACTICE MIDWIFE

## 2024-09-16 ASSESSMENT — ENCOUNTER SYMPTOMS
RESPIRATORY NEGATIVE: 0
CONSTITUTIONAL NEGATIVE: 0
ALLERGIC/IMMUNOLOGIC NEGATIVE: 0
PSYCHIATRIC NEGATIVE: 0
HEMATOLOGIC/LYMPHATIC NEGATIVE: 0
ENDOCRINE NEGATIVE: 0
EYES NEGATIVE: 0
MUSCULOSKELETAL NEGATIVE: 0
NEUROLOGICAL NEGATIVE: 0
GASTROINTESTINAL NEGATIVE: 0
CARDIOVASCULAR NEGATIVE: 0

## 2024-09-16 ASSESSMENT — EDINBURGH POSTNATAL DEPRESSION SCALE (EPDS)
THE THOUGHT OF HARMING MYSELF HAS OCCURRED TO ME: NEVER
I HAVE FELT SCARED OR PANICKY FOR NO GOOD REASON: NO, NOT AT ALL
I HAVE BLAMED MYSELF UNNECESSARILY WHEN THINGS WENT WRONG: NO, NEVER
I HAVE BEEN ABLE TO LAUGH AND SEE THE FUNNY SIDE OF THINGS: AS MUCH AS I ALWAYS COULD
I HAVE BEEN ANXIOUS OR WORRIED FOR NO GOOD REASON: NO, NOT AT ALL
I HAVE LOOKED FORWARD WITH ENJOYMENT TO THINGS: AS MUCH AS I EVER DID
I HAVE BEEN SO UNHAPPY THAT I HAVE BEEN CRYING: NO, NEVER
I HAVE BEEN SO UNHAPPY THAT I HAVE HAD DIFFICULTY SLEEPING: NOT AT ALL
TOTAL SCORE: 0
THINGS HAVE BEEN GETTING ON TOP OF ME: NO, I HAVE BEEN COPING AS WELL AS EVER
I HAVE FELT SAD OR MISERABLE: NO, NOT AT ALL

## 2024-09-16 ASSESSMENT — PAIN SCALES - GENERAL: PAINLEVEL: 0-NO PAIN

## 2024-09-16 NOTE — PROGRESS NOTES
"Plan    Advised to call office for breast complaints, abnormal bleeding, mood changes, or other concerning symptoms.   Cleared to resume normal activity as desired  UTD on pap.     Diagnoses and all orders for this visit:  Encounter for postpartum visit ([facility])  Diastasis recti  -     Referral to Physical Therapy; Future      Pablo Scott, DILMA-CNM    Subjective   29 y.o.  presenting for postpartum follow-up     Delivery Date: 24  Gestational Age: 39.1  Type of Delivery: Vaginal, Spontaneous     Pregnancy Problems (from 22 to present)       Problem Noted Resolved    Perineal laceration with delivery, second degree, delivered ([facility]) 2024 by DILMA Darling-CNP No    39 weeks gestation of pregnancy ([facility]) 2024 by Rimma Tellez MD No    Encounter for supervision of normal first pregnancy, second trimester ([facility]) 3/20/2024 by Corrine Mcmahon MD 2024 by Corrine Mcmahon MD    First pregnancy, first trimester ([facility]) 2024 by Corrine Mcmahon MD 2024 by Corrine Mcmahon MD            Concerns: none    Pain: controlled  Lacerations: 2nd degree  Lochia: still wearing a pantyliner daily, has been using a liner only for the past 2-3 weeks, no heavy bleeding. \"I could wear the same liner all day and wouldn't soak through it\"   Sexual Intimacy: No  Contraceptive Method:  Liletta placed following delivery  Feeding Method: She is breast feeding exclusively. no breast or nursing problems and working on getting baby to take the bottle as she prepares to return to work  Lactation Consult Needed?: Yes    Birth Trauma: No  Bonding with Baby: well with baby boy, Jaya  Mood:   Postpartum Depression: Low Risk  (2024)    [place]  Depression Scale     Last EPDS Total Score: 0     Last EPDS Self Harm Result: Never       Last pap: 10/2021 NILM  Objective    /80   Wt 80.3 kg (177 lb)   LMP 10/30/2023   Breastfeeding Yes   BMI 28.57 kg/m²    Physical " Exam  Genitourinary:      Vulva normal.      No lesions in the vagina.      Vaginal bleeding present.      No vaginal discharge, erythema, tenderness, ulceration or granulation tissue.      No vaginal prolapse present.     No vaginal atrophy present.     Cervix is parous.      No cervical motion tenderness, friability or lesion.      IUD strings visualized.      Uterus is not enlarged, fixed, tender, irregular or prolapsed.      No uterine mass detected.     Pelvic exam was performed with patient in the lithotomy position.   Pulmonary:      Effort: Pulmonary effort is normal.   Abdominal:      General: Abdomen is flat. There is no distension.      Palpations: Abdomen is soft. There is no mass.      Tenderness: There is no abdominal tenderness. There is no guarding or rebound.      Comments: +1 cm diastasis recti     Neurological:      Mental Status: She is alert.   Skin:     General: Skin is warm and dry.   Psychiatric:         Mood and Affect: Mood normal.         Behavior: Behavior normal.   Vitals reviewed.

## 2025-01-29 ENCOUNTER — APPOINTMENT (OUTPATIENT)
Dept: PRIMARY CARE | Facility: CLINIC | Age: 30
End: 2025-01-29
Payer: COMMERCIAL

## 2025-01-29 VITALS
OXYGEN SATURATION: 98 % | DIASTOLIC BLOOD PRESSURE: 70 MMHG | SYSTOLIC BLOOD PRESSURE: 110 MMHG | BODY MASS INDEX: 27 KG/M2 | HEART RATE: 93 BPM | HEIGHT: 66 IN | WEIGHT: 168 LBS

## 2025-01-29 DIAGNOSIS — Z00.00 ANNUAL PHYSICAL EXAM: Primary | ICD-10-CM

## 2025-01-29 PROCEDURE — 99385 PREV VISIT NEW AGE 18-39: CPT | Performed by: INTERNAL MEDICINE

## 2025-01-29 PROCEDURE — 1036F TOBACCO NON-USER: CPT | Performed by: INTERNAL MEDICINE

## 2025-01-29 PROCEDURE — 3008F BODY MASS INDEX DOCD: CPT | Performed by: INTERNAL MEDICINE

## 2025-01-29 ASSESSMENT — PATIENT HEALTH QUESTIONNAIRE - PHQ9
1. LITTLE INTEREST OR PLEASURE IN DOING THINGS: NOT AT ALL
SUM OF ALL RESPONSES TO PHQ9 QUESTIONS 1 AND 2: 0
2. FEELING DOWN, DEPRESSED OR HOPELESS: NOT AT ALL

## 2025-01-29 NOTE — PROGRESS NOTES
"Subjective   Patient ID: Aron Salinas is a 29 y.o. female who presents for Annual Exam (Re-establish patient here for complete physical exam).    HPI   The patient is 29 year old woman without significant PMH who presents to re-establish her medical care and for an annual wellness exam.    Review of Systems  Negative  Objective   /70   Pulse 93   Ht 1.676 m (5' 6\")   Wt 76.2 kg (168 lb)   SpO2 98%   Breastfeeding Yes   BMI 27.12 kg/m²     Physical Exam  NAD. Cooperative.  HEENT: WNL  Neck: WNL  Lungs CTA  Heart: RRR  Abdomen: WNL  Musculoskeletal system: WNL  Neurologic exam: WNL    Assessment/Plan   Diagnoses and all orders for this visit:  Annual physical exam  -     Comprehensive metabolic panel; Future  -     CBC; Future  -     TSH; Future  -     Lipid panel; Future  -     Hemoglobin A1C; Future         "